# Patient Record
Sex: MALE | Race: WHITE | HISPANIC OR LATINO | Employment: FULL TIME | URBAN - METROPOLITAN AREA
[De-identification: names, ages, dates, MRNs, and addresses within clinical notes are randomized per-mention and may not be internally consistent; named-entity substitution may affect disease eponyms.]

---

## 2019-06-26 ENCOUNTER — OFFICE VISIT (OUTPATIENT)
Dept: URGENT CARE | Facility: CLINIC | Age: 54
End: 2019-06-26
Payer: COMMERCIAL

## 2019-06-26 VITALS
RESPIRATION RATE: 20 BRPM | BODY MASS INDEX: 31.39 KG/M2 | WEIGHT: 200 LBS | HEART RATE: 60 BPM | TEMPERATURE: 98 F | OXYGEN SATURATION: 97 % | HEIGHT: 67 IN | SYSTOLIC BLOOD PRESSURE: 136 MMHG | DIASTOLIC BLOOD PRESSURE: 80 MMHG

## 2019-06-26 DIAGNOSIS — K40.90 LEFT INGUINAL HERNIA: Primary | ICD-10-CM

## 2019-06-26 PROCEDURE — 99203 OFFICE O/P NEW LOW 30 MIN: CPT | Performed by: PHYSICIAN ASSISTANT

## 2019-07-01 ENCOUNTER — OFFICE VISIT (OUTPATIENT)
Dept: FAMILY MEDICINE CLINIC | Facility: CLINIC | Age: 54
End: 2019-07-01
Payer: COMMERCIAL

## 2019-07-01 VITALS
SYSTOLIC BLOOD PRESSURE: 144 MMHG | BODY MASS INDEX: 30.45 KG/M2 | OXYGEN SATURATION: 97 % | TEMPERATURE: 97.9 F | WEIGHT: 194 LBS | HEART RATE: 68 BPM | DIASTOLIC BLOOD PRESSURE: 90 MMHG | RESPIRATION RATE: 18 BRPM | HEIGHT: 67 IN

## 2019-07-01 DIAGNOSIS — K40.90 INGUINAL HERNIA OF LEFT SIDE WITHOUT OBSTRUCTION OR GANGRENE: ICD-10-CM

## 2019-07-01 DIAGNOSIS — Z76.89 ENCOUNTER TO ESTABLISH CARE: Primary | ICD-10-CM

## 2019-07-01 DIAGNOSIS — R03.0 ELEVATED BLOOD-PRESSURE READING WITHOUT DIAGNOSIS OF HYPERTENSION: ICD-10-CM

## 2019-07-01 PROCEDURE — T1015 CLINIC SERVICE: HCPCS | Performed by: FAMILY MEDICINE

## 2019-07-01 RX ORDER — IBUPROFEN 200 MG
TABLET ORAL EVERY 6 HOURS PRN
COMMUNITY
End: 2020-07-03 | Stop reason: HOSPADM

## 2019-07-01 NOTE — PATIENT INSTRUCTIONS
Inguinal Hernia   WHAT YOU NEED TO KNOW:   An inguinal hernia happens when organs or abdominal tissue push through a weak spot in the abdominal wall  The abdominal wall is made of fat and muscle  It holds the intestines in place  The hernia may contain fluid, tissue from the abdomen, or part of an organ (such as an intestine)  DISCHARGE INSTRUCTIONS:   Return to the emergency department if:   · You have severe abdominal pain with nausea and vomiting  · Your abdomen is larger than usual      · Your hernia gets bigger or is purple or blue  · You see blood in your bowel movements  · You feel weak, dizzy, or faint  Contact your healthcare provider if:   · You have a fever  · You have questions or concerns about your condition or care  Medicine: You may  need the following:  · NSAIDs , such as ibuprofen, help decrease swelling, pain, and fever  NSAIDs can cause stomach bleeding or kidney problems in certain people  If you take blood thinner medicine, always ask your healthcare provider if NSAIDs are safe for you  Always read the medicine label and follow directions  · Take your medicine as directed  Contact your healthcare provider if you think your medicine is not helping or if you have side effects  Tell him or her if you are allergic to any medicine  Keep a list of the medicines, vitamins, and herbs you take  Include the amounts, and when and why you take them  Bring the list or the pill bottles to follow-up visits  Carry your medicine list with you in case of an emergency  Follow up with your healthcare provider as directed:  Write down your questions so you remember to ask them during your visits  Manage your symptoms and prevent another hernia:   · Do not lift anything heavy  Heavy lifting can make your hernia worse or cause another hernia  Ask your healthcare provider how much is safe for you to lift  · Drink liquids as directed    Liquids may prevent constipation and straining during a bowel movement  Ask how much liquid to drink each day and which liquids are best for you  · Eat foods high in fiber  Fiber may prevent constipation and straining during a bowel movement  Foods that contain fiber include fruits, vegetables, beans, lentils, and whole grains  · Maintain a healthy weight  If you are overweight, weight loss may prevent your hernia from getting worse  It may also prevent another hernia  Talk to your healthcare provider about exercise and how to lose weight safely if you are overweight  · Do not smoke  Nicotine and other chemicals in cigarettes and cigars can weaken the abdominal wall  This may increase your risk for another hernia  Ask your healthcare provider for information if you currently smoke and need help to quit  E-cigarettes or smokeless tobacco still contain nicotine  Talk to your healthcare provider before you use these products  © 2017 [address] [employer] Information is for End User's use only and may not be sold, redistributed or otherwise used for commercial purposes  All illustrations and images included in CareNotes® are the copyrighted property of A [employer] M [employer] Inc  or Jayro King  The above information is an  only  It is not intended as medical advice for individual conditions or treatments  Talk to your doctor, nurse or pharmacist before following any medical regimen to see if it is safe and effective for you

## 2019-07-01 NOTE — PROGRESS NOTES
Assessment/Plan:     Diagnoses and all orders for this visit:    Encounter to establish care  Comments:  no medical insurance  gave information for patient to contact financial counselor for available options  Inguinal hernia of left side without obstruction or gangrene  Comments:  palpable and reducible on physical exam  Continue tylenol PRN, avoid any heavy lifting and seek ED if sx worsen  Referral to surgery  Orders:  -     Ambulatory referral to General Surgery; Future    Elevated blood-pressure reading without diagnosis of hypertension  Comments:  Healthy diet, exercise, weight loss  RTC 1 month for recheck  Other orders  -     ibuprofen (MOTRIN) 200 mg tablet; Take by mouth every 6 (six) hours as needed for mild pain          Return in about 1 month (around 7/29/2019), or if symptoms worsen or fail to improve, for Next scheduled follow up  Subjective:        Patient ID: Tracy Mancilla is a 47 y o  male  Chief Complaint   Patient presents with    Hernia     patient states that he went to urgent care last week  patient complanis of some pain & burning in the pelvic region       Patient is a 42-year-old male who presents to the office today to establish care  Patient reports no past medical history of any medical problems  Patient denies any surgeries  Patient does not use tobacco, alcohol, or recreational drugs  Patient is  with 3 children  His family history is unknown  He lives in [facility] with his family  He has no medical insurance  He works in [employer], physical labor  He presented to the urgent care on 06/26/2019 with 3 week history of left lower quadrant abdominal pain  He was noted to have a reducible left inguinal hernia  He was advised to take over-the-counter Tylenol as needed for pain  It was recommended that he follow up with the primary care provider or General surgery      Currently, patient reports that he has been experiencing pain in the left lower abdomen, below beltline  He states that he is experiencing a burning sensation/discomfort when he perform certain tasks  He states discomfort is worse with using slicer at work  He has stopped all heavy lifting  He denies nausea, vomiting, diarrhea, constipation, blood in stool, melena, difficulty urinating, blood in urine, dysuria, testicular pain/swelling  Patient reports that he has been using Tylenol as needed for pain with some improvement  His symptoms have not worsened since onset about 3 weeks ago  Patient denies any trauma or known activity before symptoms occurred  He has no additional concerns today  The following portions of the patient's history were reviewed and updated as appropriate: allergies, current medications, past family history, past medical history, past social history, past surgical history and problem list     Patient Active Problem List   Diagnosis    Inguinal hernia of left side without obstruction or gangrene    Elevated blood-pressure reading without diagnosis of hypertension       Current Outpatient Medications   Medication Sig Dispense Refill    ibuprofen (MOTRIN) 200 mg tablet Take by mouth every 6 (six) hours as needed for mild pain       No current facility-administered medications for this visit  History reviewed  No pertinent past medical history  History reviewed  No pertinent surgical history       Social History     Socioeconomic History    Marital status: /Civil Union     Spouse name: Not on file    Number of children: Not on file    Years of education: Not on file    Highest education level: Not on file   Occupational History    Not on file   Social Needs    Financial resource strain: Not on file    Food insecurity:     Worry: Not on file     Inability: Not on file    Transportation needs:     Medical: Not on file     Non-medical: Not on file   Tobacco Use    Smoking status: Never Smoker    Smokeless tobacco: Never Used Substance and Sexual Activity    Alcohol use: Never     Frequency: Never    Drug use: Never    Sexual activity: Not on file   Lifestyle    Physical activity:     Days per week: Not on file     Minutes per session: Not on file    Stress: Not on file   Relationships    Social connections:     Talks on phone: Not on file     Gets together: Not on file     Attends [employer]ism service: Not on file     Active member of club or organization: Not on file     Attends meetings of clubs or organizations: Not on file     Relationship status: Not on file    Intimate partner violence:     Fear of current or ex partner: Not on file     Emotionally abused: Not on file     Physically abused: Not on file     Forced sexual activity: Not on file   Other Topics Concern    Not on file   Social History Narrative    Not on file        Review of Systems   Constitutional: Negative  Eyes: Negative for photophobia and visual disturbance  Respiratory: Negative  Cardiovascular: Negative  Gastrointestinal: Positive for abdominal pain  Negative for abdominal distention, anal bleeding, blood in stool, constipation, diarrhea, nausea, rectal pain and vomiting  Genitourinary: Negative  Neurological: Negative  Hematological: Negative  Objective:      /90   Pulse 68   Temp 97 9 °F (36 6 °C)   Resp 18   Ht 5' 7" (1 702 m)   Wt 88 kg (194 lb)   SpO2 97%   BMI 30 38 kg/m²          Physical Exam   Constitutional: He is oriented to person, place, and time  He appears well-developed and well-nourished  overweight   HENT:   Head: Normocephalic and atraumatic  Right Ear: Tympanic membrane, external ear and ear canal normal    Left Ear: Tympanic membrane, external ear and ear canal normal    Nose: Nose normal    Mouth/Throat: Oropharynx is clear and moist    Eyes: Pupils are equal, round, and reactive to light  Conjunctivae are normal    Neck: Neck supple     Cardiovascular: Normal rate, regular rhythm and normal heart sounds  Exam reveals no gallop and no friction rub  No murmur heard  Pulmonary/Chest: Effort normal and breath sounds normal    Abdominal: Soft  Normal appearance  There is no tenderness  There is no rigidity, no rebound, no guarding, no CVA tenderness, no tenderness at McBurney's point and negative Bailey's sign  A hernia is present  Hernia confirmed positive in the left inguinal area  Hernia confirmed negative in the right inguinal area  Genitourinary:         Musculoskeletal: He exhibits no edema  Lymphadenopathy: No inguinal adenopathy noted on the right or left side  Neurological: He is alert and oriented to person, place, and time

## 2019-07-08 ENCOUNTER — CONSULT (OUTPATIENT)
Dept: SURGERY | Facility: HOSPITAL | Age: 54
End: 2019-07-08

## 2019-07-08 VITALS
SYSTOLIC BLOOD PRESSURE: 142 MMHG | BODY MASS INDEX: 30.45 KG/M2 | HEART RATE: 80 BPM | HEIGHT: 67 IN | DIASTOLIC BLOOD PRESSURE: 80 MMHG | WEIGHT: 194 LBS

## 2019-07-08 DIAGNOSIS — K40.90 INGUINAL HERNIA OF LEFT SIDE WITHOUT OBSTRUCTION OR GANGRENE: Primary | ICD-10-CM

## 2019-07-08 DIAGNOSIS — K42.9 UMBILICAL HERNIA WITHOUT OBSTRUCTION AND WITHOUT GANGRENE: ICD-10-CM

## 2019-07-08 PROCEDURE — 99242 OFF/OP CONSLTJ NEW/EST SF 20: CPT | Performed by: SURGERY

## 2019-07-08 NOTE — LETTER
July 8, 2019     Shae Garsia PA-C  [address] [address]    Patient: Kirsten Saenz   YOB: 1965   Date of Visit: 7/8/2019       Dear Dr Soham Holliday: Thank you for referring Kirsten Saenz to me for evaluation  Below are my notes for this consultation  If you have questions, please do not hesitate to call me  I look forward to following your patient along with you  Sincerely,        Maria D Lopez DO        CC: No Recipients  Maria D Lopez DO  7/8/2019  2:43 PM  Sign at close encounter  Assessment/Plan:    Inguinal hernia of left side without obstruction or gangrene  Symptomatic left inguinal hernia  No significant pain just and oriented as of now  Patient currently without insurance  Signs and symptoms of incarceration strangulation were discussed the patient was advised to seek immediate medical management if this should occur  For now continue to watch  If patient does obtain a type of insurance and would like to have this repaired he is encouraged to come back and see me  Umbilical hernia  Asymptomatic umbilical hernia likely containing fat  No surgical intervention at this time  Continue to observe  Again signs of incarceration strangulation were discussed  Diagnoses and all orders for this visit:    Inguinal hernia of left side without obstruction or gangrene    Umbilical hernia without obstruction and without gangrene          Subjective:      Patient ID: Kirsten Saenz is a 47 y o  male  63-year-old male with no significant past medical history, presents today for evaluation of left groin pain  Recent seen by PCP who is felt that he had likely a symptomatic left inguinal hernia  Patient states he has had this intermittent pain and for the last 5 weeks  It is more burning the CT of radiates down towards the testicle  And does not relate in mid teniae exacerbating or alleviating factors    Patient states he can walk in walk up stairs and and lift heavy objects without any significant pain  However occasionally after some increased activity he does have some burning in the left groin which she describes of morbid 2 or 3/10 pain  Denies any nausea vomiting  No fevers or chills  No changes in bowel or bladder habits  The following portions of the patient's history were reviewed and updated as appropriate:   He  has no past medical history on file  He   Patient Active Problem List    Diagnosis Date Noted    Umbilical hernia 58/14/9271    Inguinal hernia of left side without obstruction or gangrene 07/01/2019    Elevated blood-pressure reading without diagnosis of hypertension 07/01/2019     He  has no past surgical history on file  His Family history is unknown by patient  He  reports that he has never smoked  He has never used smokeless tobacco  He reports that he does not drink alcohol or use drugs  Current Outpatient Medications   Medication Sig Dispense Refill    ibuprofen (MOTRIN) 200 mg tablet Take by mouth every 6 (six) hours as needed for mild pain       No current facility-administered medications for this visit  He has No Known Allergies       Review of Systems   Constitutional: Negative for activity change, appetite change, chills, diaphoresis, fatigue, fever and unexpected weight change  Gastrointestinal: Positive for abdominal pain (Left groin pain)  Negative for abdominal distention, constipation, diarrhea, nausea and rectal pain  Genitourinary: Negative for difficulty urinating  Objective:      /80   Pulse 80   Ht 5' 7" (1 702 m)   Wt 88 kg (194 lb)   BMI 30 38 kg/m²           Physical Exam   Constitutional: He is oriented to person, place, and time  He appears well-developed and well-nourished  No distress  HENT:   Head: Normocephalic and atraumatic  Eyes: No scleral icterus  Abdominal: Soft  He exhibits no distension and no mass  There is tenderness   There is no rebound and no guarding (mild tenderness along the left groin  )  A hernia ( umbilical hernia likely containing fat ) is present  Hernia confirmed positive in the left inguinal area  Hernia confirmed negative in the right inguinal area  Lymphadenopathy: No inguinal adenopathy noted on the right or left side  Neurological: He is alert and oriented to person, place, and time  No cranial nerve deficit  Skin: Skin is warm  No rash noted  He is not diaphoretic  No erythema  No pallor  Psychiatric: He has a normal mood and affect  His behavior is normal    Vitals reviewed

## 2019-07-08 NOTE — ASSESSMENT & PLAN NOTE
Symptomatic left inguinal hernia  No significant pain just and oriented as of now  Patient currently without insurance  Signs and symptoms of incarceration strangulation were discussed the patient was advised to seek immediate medical management if this should occur  For now continue to watch  If patient does obtain a type of insurance and would like to have this repaired he is encouraged to come back and see me

## 2019-07-08 NOTE — PROGRESS NOTES
Assessment/Plan:    Inguinal hernia of left side without obstruction or gangrene  Symptomatic left inguinal hernia  No significant pain just and oriented as of now  Patient currently without insurance  Signs and symptoms of incarceration strangulation were discussed the patient was advised to seek immediate medical management if this should occur  For now continue to watch  If patient does obtain a type of insurance and would like to have this repaired he is encouraged to come back and see me  Umbilical hernia  Asymptomatic umbilical hernia likely containing fat  No surgical intervention at this time  Continue to observe  Again signs of incarceration strangulation were discussed  Diagnoses and all orders for this visit:    Inguinal hernia of left side without obstruction or gangrene    Umbilical hernia without obstruction and without gangrene          Subjective:      Patient ID: Ansley Marie is a 47 y o  male  42-year-old male with no significant past medical history, presents today for evaluation of left groin pain  Recent seen by PCP who is felt that he had likely a symptomatic left inguinal hernia  Patient states he has had this intermittent pain and for the last 5 weeks  It is more burning the CT of radiates down towards the testicle  And does not relate in mid teniae exacerbating or alleviating factors  Patient states he can walk in walk up stairs and and lift heavy objects without any significant pain  However occasionally after some increased activity he does have some burning in the left groin which she describes of morbid 2 or 3/10 pain  Denies any nausea vomiting  No fevers or chills  No changes in bowel or bladder habits  The following portions of the patient's history were reviewed and updated as appropriate:   He  has no past medical history on file    He   Patient Active Problem List    Diagnosis Date Noted    Umbilical hernia 73/87/8411    Inguinal hernia of left side without obstruction or gangrene 07/01/2019    Elevated blood-pressure reading without diagnosis of hypertension 07/01/2019     He  has no past surgical history on file  His Family history is unknown by patient  He  reports that he has never smoked  He has never used smokeless tobacco  He reports that he does not drink alcohol or use drugs  Current Outpatient Medications   Medication Sig Dispense Refill    ibuprofen (MOTRIN) 200 mg tablet Take by mouth every 6 (six) hours as needed for mild pain       No current facility-administered medications for this visit  He has No Known Allergies       Review of Systems   Constitutional: Negative for activity change, appetite change, chills, diaphoresis, fatigue, fever and unexpected weight change  Gastrointestinal: Positive for abdominal pain (Left groin pain)  Negative for abdominal distention, constipation, diarrhea, nausea and rectal pain  Genitourinary: Negative for difficulty urinating  Objective:      /80   Pulse 80   Ht 5' 7" (1 702 m)   Wt 88 kg (194 lb)   BMI 30 38 kg/m²          Physical Exam   Constitutional: He is oriented to person, place, and time  He appears well-developed and well-nourished  No distress  HENT:   Head: Normocephalic and atraumatic  Eyes: No scleral icterus  Abdominal: Soft  He exhibits no distension and no mass  There is tenderness  There is no rebound and no guarding (mild tenderness along the left groin  )  A hernia ( umbilical hernia likely containing fat ) is present  Hernia confirmed positive in the left inguinal area  Hernia confirmed negative in the right inguinal area  Lymphadenopathy: No inguinal adenopathy noted on the right or left side  Neurological: He is alert and oriented to person, place, and time  No cranial nerve deficit  Skin: Skin is warm  No rash noted  He is not diaphoretic  No erythema  No pallor  Psychiatric: He has a normal mood and affect   His behavior is normal  Vitals reviewed

## 2019-07-08 NOTE — ASSESSMENT & PLAN NOTE
Asymptomatic umbilical hernia likely containing fat  No surgical intervention at this time  Continue to observe  Again signs of incarceration strangulation were discussed

## 2020-02-24 DIAGNOSIS — Z12.11 COLON CANCER SCREENING: Primary | ICD-10-CM

## 2020-07-01 ENCOUNTER — TELEPHONE (OUTPATIENT)
Dept: OTHER | Facility: OTHER | Age: 55
End: 2020-07-01

## 2020-07-01 NOTE — TELEPHONE ENCOUNTER
[phone] Landon Jim/[birth year]/Hernia is worsening, needs to stop about every hour to ice it  Extreme pain and discomfort

## 2020-07-02 ENCOUNTER — APPOINTMENT (OUTPATIENT)
Dept: CT IMAGING | Facility: HOSPITAL | Age: 55
End: 2020-07-02
Payer: OTHER GOVERNMENT

## 2020-07-02 ENCOUNTER — HOSPITAL ENCOUNTER (EMERGENCY)
Facility: HOSPITAL | Age: 55
End: 2020-07-02
Attending: EMERGENCY MEDICINE | Admitting: EMERGENCY MEDICINE
Payer: OTHER GOVERNMENT

## 2020-07-02 ENCOUNTER — ANESTHESIA (INPATIENT)
Dept: PERIOP | Facility: HOSPITAL | Age: 55
End: 2020-07-02

## 2020-07-02 ENCOUNTER — HOSPITAL ENCOUNTER (OUTPATIENT)
Facility: HOSPITAL | Age: 55
Setting detail: OUTPATIENT SURGERY
Discharge: HOME | End: 2020-07-03
Attending: SURGERY | Admitting: SURGERY
Payer: OTHER GOVERNMENT

## 2020-07-02 ENCOUNTER — ANESTHESIA EVENT (INPATIENT)
Dept: PERIOP | Facility: HOSPITAL | Age: 55
End: 2020-07-02

## 2020-07-02 VITALS
TEMPERATURE: 98.3 F | OXYGEN SATURATION: 100 % | WEIGHT: 187.17 LBS | BODY MASS INDEX: 29.38 KG/M2 | SYSTOLIC BLOOD PRESSURE: 165 MMHG | HEIGHT: 67 IN | HEART RATE: 65 BPM | RESPIRATION RATE: 18 BRPM | DIASTOLIC BLOOD PRESSURE: 85 MMHG

## 2020-07-02 DIAGNOSIS — K40.90 LEFT INGUINAL HERNIA: Primary | ICD-10-CM

## 2020-07-02 DIAGNOSIS — K40.90 INGUINAL HERNIA OF LEFT SIDE WITHOUT OBSTRUCTION OR GANGRENE: Primary | ICD-10-CM

## 2020-07-02 LAB
ALBUMIN SERPL BCP-MCNC: 4 G/DL (ref 3.5–5)
ALP SERPL-CCNC: 40 U/L (ref 46–116)
ALT SERPL W P-5'-P-CCNC: 30 U/L (ref 12–78)
ANION GAP SERPL CALCULATED.3IONS-SCNC: 8 MMOL/L (ref 4–13)
AST SERPL W P-5'-P-CCNC: 20 U/L (ref 5–45)
BASOPHILS # BLD AUTO: 0.04 THOUSANDS/ΜL (ref 0–0.1)
BASOPHILS NFR BLD AUTO: 1 % (ref 0–1)
BILIRUB SERPL-MCNC: 0.9 MG/DL (ref 0.2–1)
BUN SERPL-MCNC: 16 MG/DL (ref 5–25)
CALCIUM SERPL-MCNC: 8.9 MG/DL (ref 8.3–10.1)
CHLORIDE SERPL-SCNC: 105 MMOL/L (ref 100–108)
CO2 SERPL-SCNC: 29 MMOL/L (ref 21–32)
CREAT SERPL-MCNC: 0.89 MG/DL (ref 0.6–1.3)
EOSINOPHIL # BLD AUTO: 0.04 THOUSAND/ΜL (ref 0–0.61)
EOSINOPHIL NFR BLD AUTO: 1 % (ref 0–6)
ERYTHROCYTE [DISTWIDTH] IN BLOOD BY AUTOMATED COUNT: 12.9 % (ref 11.6–15.1)
GFR SERPL CREATININE-BSD FRML MDRD: 96 ML/MIN/1.73SQ M
GLUCOSE SERPL-MCNC: 97 MG/DL (ref 65–140)
HCT VFR BLD AUTO: 40.8 % (ref 36.5–49.3)
HGB BLD-MCNC: 13.4 G/DL (ref 12–17)
IMM GRANULOCYTES # BLD AUTO: 0.02 THOUSAND/UL (ref 0–0.2)
IMM GRANULOCYTES NFR BLD AUTO: 0 % (ref 0–2)
INR PPP: 1.05 (ref 0.84–1.19)
LYMPHOCYTES # BLD AUTO: 1.77 THOUSANDS/ΜL (ref 0.6–4.47)
LYMPHOCYTES NFR BLD AUTO: 27 % (ref 14–44)
MCH RBC QN AUTO: 29.5 PG (ref 26.8–34.3)
MCHC RBC AUTO-ENTMCNC: 32.8 G/DL (ref 31.4–37.4)
MCV RBC AUTO: 90 FL (ref 82–98)
MONOCYTES # BLD AUTO: 0.46 THOUSAND/ΜL (ref 0.17–1.22)
MONOCYTES NFR BLD AUTO: 7 % (ref 4–12)
NEUTROPHILS # BLD AUTO: 4.17 THOUSANDS/ΜL (ref 1.85–7.62)
NEUTS SEG NFR BLD AUTO: 64 % (ref 43–75)
NRBC BLD AUTO-RTO: 0 /100 WBCS
PLATELET # BLD AUTO: 292 THOUSANDS/UL (ref 149–390)
PMV BLD AUTO: 9.7 FL (ref 8.9–12.7)
POTASSIUM SERPL-SCNC: 3.7 MMOL/L (ref 3.5–5.3)
PROT SERPL-MCNC: 7.8 G/DL (ref 6.4–8.2)
PROTHROMBIN TIME: 13.7 SECONDS (ref 11.6–14.5)
RBC # BLD AUTO: 4.55 MILLION/UL (ref 3.88–5.62)
SARS-COV-2 RNA RESP QL NAA+PROBE: NEGATIVE
SODIUM SERPL-SCNC: 142 MMOL/L (ref 136–145)
WBC # BLD AUTO: 6.5 THOUSAND/UL (ref 4.31–10.16)

## 2020-07-02 PROCEDURE — 87635 SARS-COV-2 COVID-19 AMP PRB: CPT | Performed by: PHYSICIAN ASSISTANT

## 2020-07-02 PROCEDURE — 80053 COMPREHEN METABOLIC PANEL: CPT | Performed by: PHYSICIAN ASSISTANT

## 2020-07-02 PROCEDURE — NC001 PR NO CHARGE: Performed by: SURGERY

## 2020-07-02 PROCEDURE — C1781 MESH (IMPLANTABLE): HCPCS | Performed by: SURGERY

## 2020-07-02 PROCEDURE — 74177 CT ABD & PELVIS W/CONTRAST: CPT

## 2020-07-02 PROCEDURE — 49505 PRP I/HERN INIT REDUC >5 YR: CPT | Performed by: SURGERY

## 2020-07-02 PROCEDURE — 99285 EMERGENCY DEPT VISIT HI MDM: CPT | Performed by: PHYSICIAN ASSISTANT

## 2020-07-02 PROCEDURE — 49505 PRP I/HERN INIT REDUC >5 YR: CPT | Performed by: PHYSICIAN ASSISTANT

## 2020-07-02 PROCEDURE — 99285 EMERGENCY DEPT VISIT HI MDM: CPT

## 2020-07-02 PROCEDURE — 99214 OFFICE O/P EST MOD 30 MIN: CPT | Performed by: PHYSICIAN ASSISTANT

## 2020-07-02 PROCEDURE — 36415 COLL VENOUS BLD VENIPUNCTURE: CPT | Performed by: PHYSICIAN ASSISTANT

## 2020-07-02 PROCEDURE — 85025 COMPLETE CBC W/AUTO DIFF WBC: CPT | Performed by: PHYSICIAN ASSISTANT

## 2020-07-02 PROCEDURE — 85610 PROTHROMBIN TIME: CPT | Performed by: PHYSICIAN ASSISTANT

## 2020-07-02 DEVICE — BARD MESH PERFIX PLUG, LARGE
Type: IMPLANTABLE DEVICE | Site: INGUINAL | Status: FUNCTIONAL
Brand: BARD MESH PERFIX PLUG

## 2020-07-02 RX ORDER — BUPIVACAINE HYDROCHLORIDE 5 MG/ML
INJECTION, SOLUTION PERINEURAL AS NEEDED
Status: DISCONTINUED | OUTPATIENT
Start: 2020-07-02 | End: 2020-07-02 | Stop reason: HOSPADM

## 2020-07-02 RX ORDER — HYDROMORPHONE HCL/PF 1 MG/ML
0.5 SYRINGE (ML) INJECTION EVERY 4 HOURS PRN
Status: DISCONTINUED | OUTPATIENT
Start: 2020-07-02 | End: 2020-07-02 | Stop reason: HOSPADM

## 2020-07-02 RX ORDER — CEFAZOLIN SODIUM 2 G/50ML
2000 SOLUTION INTRAVENOUS
Status: COMPLETED | OUTPATIENT
Start: 2020-07-02 | End: 2020-07-02

## 2020-07-02 RX ORDER — KETOROLAC TROMETHAMINE 30 MG/ML
INJECTION, SOLUTION INTRAMUSCULAR; INTRAVENOUS AS NEEDED
Status: DISCONTINUED | OUTPATIENT
Start: 2020-07-02 | End: 2020-07-02 | Stop reason: SURG

## 2020-07-02 RX ORDER — FENTANYL CITRATE 50 UG/ML
INJECTION, SOLUTION INTRAMUSCULAR; INTRAVENOUS AS NEEDED
Status: DISCONTINUED | OUTPATIENT
Start: 2020-07-02 | End: 2020-07-02 | Stop reason: SURG

## 2020-07-02 RX ORDER — ONDANSETRON 2 MG/ML
INJECTION INTRAMUSCULAR; INTRAVENOUS AS NEEDED
Status: DISCONTINUED | OUTPATIENT
Start: 2020-07-02 | End: 2020-07-02 | Stop reason: SURG

## 2020-07-02 RX ORDER — PROPOFOL 10 MG/ML
INJECTION, EMULSION INTRAVENOUS AS NEEDED
Status: DISCONTINUED | OUTPATIENT
Start: 2020-07-02 | End: 2020-07-02 | Stop reason: SURG

## 2020-07-02 RX ORDER — DEXAMETHASONE SODIUM PHOSPHATE 10 MG/ML
INJECTION, SOLUTION INTRAMUSCULAR; INTRAVENOUS AS NEEDED
Status: DISCONTINUED | OUTPATIENT
Start: 2020-07-02 | End: 2020-07-02 | Stop reason: SURG

## 2020-07-02 RX ORDER — MORPHINE SULFATE 4 MG/ML
4 INJECTION, SOLUTION INTRAMUSCULAR; INTRAVENOUS
Status: DISCONTINUED | OUTPATIENT
Start: 2020-07-02 | End: 2020-07-03 | Stop reason: HOSPADM

## 2020-07-02 RX ORDER — CEFAZOLIN SODIUM 2 G/50ML
2000 SOLUTION INTRAVENOUS EVERY 8 HOURS
Status: DISCONTINUED | OUTPATIENT
Start: 2020-07-02 | End: 2020-07-03 | Stop reason: HOSPADM

## 2020-07-02 RX ORDER — MAGNESIUM HYDROXIDE 1200 MG/15ML
LIQUID ORAL AS NEEDED
Status: DISCONTINUED | OUTPATIENT
Start: 2020-07-02 | End: 2020-07-02 | Stop reason: HOSPADM

## 2020-07-02 RX ORDER — SUCCINYLCHOLINE/SOD CL,ISO/PF 100 MG/5ML
SYRINGE (ML) INTRAVENOUS AS NEEDED
Status: DISCONTINUED | OUTPATIENT
Start: 2020-07-02 | End: 2020-07-02 | Stop reason: SURG

## 2020-07-02 RX ORDER — ONDANSETRON 2 MG/ML
4 INJECTION INTRAMUSCULAR; INTRAVENOUS EVERY 6 HOURS PRN
Status: DISCONTINUED | OUTPATIENT
Start: 2020-07-02 | End: 2020-07-02 | Stop reason: HOSPADM

## 2020-07-02 RX ORDER — HYDROMORPHONE HCL/PF 1 MG/ML
0.5 SYRINGE (ML) INJECTION
Status: DISCONTINUED | OUTPATIENT
Start: 2020-07-02 | End: 2020-07-02

## 2020-07-02 RX ORDER — ONDANSETRON 2 MG/ML
4 INJECTION INTRAMUSCULAR; INTRAVENOUS EVERY 6 HOURS PRN
Status: DISCONTINUED | OUTPATIENT
Start: 2020-07-02 | End: 2020-07-03 | Stop reason: HOSPADM

## 2020-07-02 RX ORDER — MIDAZOLAM HYDROCHLORIDE 2 MG/2ML
INJECTION, SOLUTION INTRAMUSCULAR; INTRAVENOUS AS NEEDED
Status: DISCONTINUED | OUTPATIENT
Start: 2020-07-02 | End: 2020-07-02 | Stop reason: SURG

## 2020-07-02 RX ORDER — SODIUM CHLORIDE, SODIUM LACTATE, POTASSIUM CHLORIDE, CALCIUM CHLORIDE 600; 310; 30; 20 MG/100ML; MG/100ML; MG/100ML; MG/100ML
100 INJECTION, SOLUTION INTRAVENOUS CONTINUOUS
Status: DISCONTINUED | OUTPATIENT
Start: 2020-07-02 | End: 2020-07-02 | Stop reason: HOSPADM

## 2020-07-02 RX ORDER — SODIUM CHLORIDE 9 MG/ML
125 INJECTION, SOLUTION INTRAVENOUS CONTINUOUS
Status: DISCONTINUED | OUTPATIENT
Start: 2020-07-02 | End: 2020-07-03 | Stop reason: HOSPADM

## 2020-07-02 RX ORDER — ACETAMINOPHEN 325 MG/1
650 TABLET ORAL EVERY 6 HOURS PRN
Status: DISCONTINUED | OUTPATIENT
Start: 2020-07-02 | End: 2020-07-03 | Stop reason: HOSPADM

## 2020-07-02 RX ORDER — SODIUM CHLORIDE 9 MG/ML
125 INJECTION, SOLUTION INTRAVENOUS CONTINUOUS
Status: DISCONTINUED | OUTPATIENT
Start: 2020-07-02 | End: 2020-07-02

## 2020-07-02 RX ORDER — CEFAZOLIN SODIUM 2 G/50ML
2000 SOLUTION INTRAVENOUS
Status: DISCONTINUED | OUTPATIENT
Start: 2020-07-03 | End: 2020-07-02 | Stop reason: SDUPTHER

## 2020-07-02 RX ORDER — OXYCODONE HYDROCHLORIDE 10 MG/1
10 TABLET ORAL EVERY 6 HOURS PRN
Status: DISCONTINUED | OUTPATIENT
Start: 2020-07-02 | End: 2020-07-03 | Stop reason: HOSPADM

## 2020-07-02 RX ORDER — PROMETHAZINE HYDROCHLORIDE 25 MG/ML
25 INJECTION, SOLUTION INTRAMUSCULAR; INTRAVENOUS ONCE AS NEEDED
Status: DISCONTINUED | OUTPATIENT
Start: 2020-07-02 | End: 2020-07-02

## 2020-07-02 RX ORDER — OXYCODONE HYDROCHLORIDE AND ACETAMINOPHEN 5; 325 MG/1; MG/1
1 TABLET ORAL EVERY 4 HOURS PRN
Qty: 20 TABLET | Refills: 0 | Status: SHIPPED | OUTPATIENT
Start: 2020-07-02

## 2020-07-02 RX ORDER — HEPARIN SODIUM 5000 [USP'U]/ML
5000 INJECTION, SOLUTION INTRAVENOUS; SUBCUTANEOUS EVERY 8 HOURS SCHEDULED
Status: DISCONTINUED | OUTPATIENT
Start: 2020-07-03 | End: 2020-07-03 | Stop reason: HOSPADM

## 2020-07-02 RX ORDER — FENTANYL CITRATE/PF 50 MCG/ML
50 SYRINGE (ML) INJECTION
Status: DISCONTINUED | OUTPATIENT
Start: 2020-07-02 | End: 2020-07-02

## 2020-07-02 RX ORDER — EPHEDRINE SULFATE 50 MG/ML
INJECTION INTRAVENOUS AS NEEDED
Status: DISCONTINUED | OUTPATIENT
Start: 2020-07-02 | End: 2020-07-02 | Stop reason: SURG

## 2020-07-02 RX ADMIN — DEXAMETHASONE SODIUM PHOSPHATE 10 MG: 10 INJECTION, SOLUTION INTRAMUSCULAR; INTRAVENOUS at 17:01

## 2020-07-02 RX ADMIN — SODIUM CHLORIDE, SODIUM LACTATE, POTASSIUM CHLORIDE, AND CALCIUM CHLORIDE 100 ML/HR: .6; .31; .03; .02 INJECTION, SOLUTION INTRAVENOUS at 11:35

## 2020-07-02 RX ADMIN — FENTANYL CITRATE 50 MCG: 50 INJECTION INTRAMUSCULAR; INTRAVENOUS at 16:44

## 2020-07-02 RX ADMIN — SODIUM CHLORIDE 125 ML/HR: 0.9 INJECTION, SOLUTION INTRAVENOUS at 14:55

## 2020-07-02 RX ADMIN — MIDAZOLAM HYDROCHLORIDE 2 MG: 1 INJECTION, SOLUTION INTRAMUSCULAR; INTRAVENOUS at 16:48

## 2020-07-02 RX ADMIN — FENTANYL CITRATE 50 MCG: 50 INJECTION INTRAMUSCULAR; INTRAVENOUS at 16:52

## 2020-07-02 RX ADMIN — OXYCODONE HYDROCHLORIDE 10 MG: 10 TABLET ORAL at 22:03

## 2020-07-02 RX ADMIN — ONDANSETRON 4 MG: 2 INJECTION INTRAMUSCULAR; INTRAVENOUS at 17:01

## 2020-07-02 RX ADMIN — CEFAZOLIN SODIUM 2000 MG: 2 SOLUTION INTRAVENOUS at 11:36

## 2020-07-02 RX ADMIN — EPHEDRINE SULFATE 10 MG: 50 INJECTION, SOLUTION INTRAVENOUS at 17:19

## 2020-07-02 RX ADMIN — PROPOFOL 200 MG: 10 INJECTION, EMULSION INTRAVENOUS at 16:49

## 2020-07-02 RX ADMIN — CEFAZOLIN SODIUM 2000 MG: 2 SOLUTION INTRAVENOUS at 16:44

## 2020-07-02 RX ADMIN — Medication 100 MG: at 16:49

## 2020-07-02 RX ADMIN — SODIUM CHLORIDE 125 ML/HR: 0.9 INJECTION, SOLUTION INTRAVENOUS at 20:27

## 2020-07-02 RX ADMIN — KETOROLAC TROMETHAMINE 30 MG: 30 INJECTION, SOLUTION INTRAMUSCULAR; INTRAVENOUS at 18:28

## 2020-07-02 RX ADMIN — MORPHINE SULFATE 4 MG: 4 INJECTION INTRAVENOUS at 20:28

## 2020-07-02 RX ADMIN — IOHEXOL 100 ML: 350 INJECTION, SOLUTION INTRAVENOUS at 11:30

## 2020-07-02 NOTE — EMTALA/ACUTE CARE TRANSFER
[facility] EMERGENCY DEPARTMENT  [address] [place] [id number]  Dept: [phone]      EMTALA TRANSFER CONSENT    NAME Charis Whitten                                         1965                              MRN [id number]    I have been informed of my rights regarding examination, treatment, and transfer   by Dr Red Kramer,     Benefits:   general surgery    Risks:   transport risks      Consent for Transfer:  I acknowledge that my medical condition has been evaluated and explained to me by the emergency department physician or other qualified medical person and/or my attending physician, who has recommended that I be transferred to the service of   Dr Poornima Cabrera at  [address] [place] [address]  The above potential benefits of such transfer, the potential risks associated with such transfer, and the probable risks of not being transferred have been explained to me, and I fully understand them  The doctor has explained that, in my case, the benefits of transfer outweigh the risks  I agree to be transferred  I authorize the performance of emergency medical procedures and treatments upon me in both transit and upon arrival at the receiving facility  Additionally, I authorize the release of any and all medical records to the receiving facility and request they be transported with me, if possible  I understand that the safest mode of transportation during a medical emergency is an ambulance and that the Hospital advocates the use of this mode of transport  Risks of traveling to the receiving facility by car, including absence of medical control, life sustaining equipment, such as oxygen, and medical personnel has been explained to me and I fully understand them  (MARIXA CORRECT BOX BELOW)  [  ]  I consent to the stated transfer and to be transported by ambulance/helicopter    [  ]  I consent to the stated transfer, but refuse transportation by ambulance and accept full responsibility for my transportation by car  I understand the risks of non-ambulance transfers and I exonerate the Hospital and its staff from any deterioration in my condition that results from this refusal     X___________________________________________    DATE  20  TIME________  Signature of patient or legally responsible individual signing on patient behalf           RELATIONSHIP TO PATIENT_________________________          Provider Certification    NAME Binnie Holter                                         1965                              MRN [id number]    A medical screening exam was performed on the above named patient  Based on the examination:    Condition Necessitating Transfer The encounter diagnosis was Left inguinal hernia  Patient Condition:  Stable    Reason for Transfer:  General Surgery    Transfer Requirements: [facility]  · Space available and qualified personnel available for treatment as acknowledged by    · Agreed to accept transfer and to provide appropriate medical treatment as acknowledged by          · Appropriate medical records of the examination and treatment of the patient are provided at the time of transfer   [facility], [address] _______  · Transfer will be performed by qualified personnel from    and appropriate transfer equipment as required, including the use of necessary and appropriate life support measures      Provider Certification: I have examined the patient and explained the following risks and benefits of being transferred/refusing transfer to the patient/family:         Based on these reasonable risks and benefits to the patient and/or the unborn child(thor), and based upon the information available at the time of the patients examination, I certify that the medical benefits reasonably to be expected from the provision of appropriate medical treatments at another medical facility outweigh the increasing risks, if any, to the individuals medical condition, and in the case of labor to the unborn child, from effecting the transfer      X____________________________________________ DATE 07/02/20        TIME_______      ORIGINAL - SEND TO MEDICAL RECORDS   COPY - SEND WITH PATIENT DURING TRANSFER

## 2020-07-02 NOTE — H&P
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 do any type of daily activities without severe pain  Patient initially seen at [address] [place] [address]  Due to the fact that he was given oral contrast in the ER for CT scan he was unable to undergo an operation in a timely fashion and subsequently due to on-call regulations between the 2 os pills, he was transferred to [facility] to undergo a left inguinal hernia repair  Plan:  - IV fluid  - pain control  - NPO    Preop consent for left inguinal hernia pair with mesh  History of Present Illness     HPI:  Julio Juarez is a 54 y o  male with known left inguinal hernia, presents to the ER at [address] [place] [address] with worsening pain over the last few days in the left groin  Patient was initially seen by me approximately 1 year ago and was symptomatic but states that he was able to do his daily activities and work without any significant problems and he would prefer to hold off surgery  However over the last few days/week the pain is worse in the left lower quadrant  He is sometimes able to reduce the hernia however he states that it hurts when he is lying when he sitting and when he standing  He is unable to do his daily life activities and is tough for him to work  He call the online service last evening and I spoke with him and that time he states that he was icing his groin with little improvement  He want to come to the ER and is spoke Maria Isabel Da Silva stated that the best thing to do if there was that that was to come to the ER and get checked down potentially undergo surgery  He presented today admit [address] [place] [address] again with worsening pain left lower quadrant  Pain was sharp stabbing and localized the left groin    No overlying skin changes  No nausea vomiting  No fevers or chills  No changes in bowel or bladder habits  No shortness of breath or chest pain  Consults    Review of Systems     A review systems was completed, all negative except as noted above HPI  Historical Information   Past Medical History:   Diagnosis Date    Chronic dental pain     Left inguinal hernia     Umbilical hernia      History reviewed  No pertinent surgical history    Social History   Social History     Substance and Sexual Activity   Alcohol Use Never    Frequency: Never     Social History     Substance and Sexual Activity   Drug Use Never     Social History     Tobacco Use   Smoking Status Never Smoker   Smokeless Tobacco Never Used     Family History: non-contributory    Meds/Allergies   all current active meds have been reviewed  No Known Allergies    Objective   First Vitals:   Blood Pressure: 142/81 (07/02/20 1437)  Pulse: 63 (07/02/20 1437)  Temperature: 98 4 °F (36 9 °C) (07/02/20 1437)  Temp Source: Tympanic (07/02/20 1437)  Respirations: 18 (07/02/20 1437)  Height: 5' 7" (170 2 cm) (07/02/20 1434)  Weight - Scale: 83 3 kg (183 lb 12 1 oz) (07/02/20 1434)  SpO2: 99 % (07/02/20 1437)    Current Vitals:   Blood Pressure: 142/81 (07/02/20 1437)  Pulse: 63 (07/02/20 1437)  Temperature: 98 4 °F (36 9 °C) (07/02/20 1437)  Temp Source: Tympanic (07/02/20 1437)  Respirations: 18 (07/02/20 1437)  Height: 5' 7" (170 2 cm) (07/02/20 1434)  Weight - Scale: 83 3 kg (183 lb 12 1 oz) (07/02/20 1434)  SpO2: 99 % (07/02/20 1437)      Intake/Output Summary (Last 24 hours) at 7/2/2020 1837  Last data filed at 7/2/2020 1749  Gross per 24 hour   Intake    Output 50 ml   Net -50 ml       Invasive Devices     Peripheral Intravenous Line            Peripheral IV 07/02/20 Left Antecubital less than 1 day          Airway            ETT  Cuffed;Oral 7 5 mm less than 1 day                Physical Exam   General:  No acute distress, resting comfortably  HEENT:  No 2nd atraumatic trachea midline  CV:  S1 audible, regular rate rhythm, no murmurs rubs or gallops  Pulmonary:  Clear auscultation bilaterally, no wheezes rales rhonchi  GI:  Abdomen soft, nontender nondistended  Left inguinal region with noted palpable hernia with significant tender palpation, partially reducible  No overlying skin changes  MSK:  Without clubbing cyanosis  Neurologic:  Alert and x3, cranial 2-12 grossly intact  Psych:  Mood and affect appropriate    Lab Results:   I have personally reviewed pertinent lab results  , CBC:   Lab Results   Component Value Date    WBC 6 50 07/02/2020    HGB 13 4 07/02/2020    HCT 40 8 07/02/2020    MCV 90 07/02/2020     07/02/2020    MCH 29 5 07/02/2020    MCHC 32 8 07/02/2020    RDW 12 9 07/02/2020    MPV 9 7 07/02/2020    NRBC 0 07/02/2020   , CMP:   Lab Results   Component Value Date    SODIUM 142 07/02/2020    K 3 7 07/02/2020     07/02/2020    CO2 29 07/02/2020    BUN 16 07/02/2020    CREATININE 0 89 07/02/2020    CALCIUM 8 9 07/02/2020    AST 20 07/02/2020    ALT 30 07/02/2020    ALKPHOS 40 (L) 07/02/2020    EGFR 96 07/02/2020   , Coagulation:   Lab Results   Component Value Date    INR 1 05 07/02/2020   , Urinalysis: No results found for: [place], SPECGRAV, PHUR, LEUKOCYTESUR, NITRITE, PROTEINUA, GLUCOSEU, KETONESU, BILIRUBINUR, BLOODU, Amylase: No results found for: AMYLASE, Lipase: No results found for: LIPASE  Imaging: I have personally reviewed pertinent films in PACS  EKG, Pathology, and Other Studies: I have personally reviewed pertinent reports

## 2020-07-02 NOTE — H&P
H&P- Dominguez Amanda 1965, 54 y o  male MRN: [id number]    Unit/Bed#: -02 Encounter: [id number]    Primary Care Provider: Lukasz Chapman PA-C   Date and time admitted to hospital: 7/2/2020  2:33 PM        * Inguinal hernia of left side without obstruction or gangrene  Assessment & Plan  Pleasant 53 yo M without significant PMH presenting for acute on chronic pain secondary to fat containing LIH  Patient seen and evaluated in [address] [place] [address] ER by Dr Adilene García, transferred due to after hour OR protocols to [address] [place] [address] [address]  Clinically stable, pain persistent, but controlled, no nausea, vomiting  Exam reveals asymptomatic umbilical hernia and tender fat containing left inguinal hernia  Afebrile, vital signs stable  CBC, CMP, INR all WNL, Covid negative  Plan:  Admit under Dr Adilene García  NPO, IVF, pain control  To go to OR today for left inguinal herniorrhaphy  Procedure, risks, benefits, and alternatives dicussed and questions answered  Dr Adilene García to obtain informed consent  History of Present Illness   HPI:  Dominguez Amanda is a 54 y o  male without PMH presenting with acute on chronic pain related to [facility]  Hernia present for "years"  Seen by Dr Adilene García in 07/2019, but did not pursue surgical intervention  Since that time the hernia has grown in size and causing more significant pain  Admits to left groin pain as well as lumbar pack pain he attributes to the hernia  Works in  requiring heavy lifting  Uses Truss belt/underwear for support  Reports hernia remains reducible, but will bulge out almost immediately afterwards  Has known umbilical hernia that is asymptomatic  No nausea, vomiting, abdominal pain, diarrhea, constipation  No fever, chills, chest pain, dyspnea  Patient primarily [address] speaking, but able to hold conversation in [place]   Demonstrates understanding of topics discussed as well as risks of surgery and expected post-op course  Review of Systems   Constitutional: Negative for chills and fever  Respiratory: Negative for cough and shortness of breath  Cardiovascular: Negative for chest pain and leg swelling  Gastrointestinal: Negative for abdominal pain, constipation, diarrhea, nausea and vomiting  Genitourinary:        Groin pain, left side  Musculoskeletal: Positive for back pain  All other systems reviewed and are negative  Historical Information   Past Medical History:   Diagnosis Date    Chronic dental pain     Left inguinal hernia     Umbilical hernia      History reviewed  No pertinent surgical history  Social History   Social History     Substance and Sexual Activity   Alcohol Use Never    Frequency: Never     Social History     Substance and Sexual Activity   Drug Use Never     Social History     Tobacco Use   Smoking Status Never Smoker   Smokeless Tobacco Never Used     E-Cigarette/Vaping    E-Cigarette Use Never User      E-Cigarette/Vaping Substances     Family History:   Family History   Problem Relation Age of Onset    Cancer Neg Hx        Meds/Allergies   PTA meds:   Prior to Admission Medications   Prescriptions Last Dose Informant Patient Reported?  Taking?   ibuprofen (MOTRIN) 200 mg tablet   Yes No   Sig: Take by mouth every 6 (six) hours as needed for mild pain      Facility-Administered Medications: None     No Known Allergies    Objective   First Vitals:   Blood Pressure: 142/81 (07/02/20 1437)  Pulse: 63 (07/02/20 1437)  Temperature: 98 4 °F (36 9 °C) (07/02/20 1437)  Temp Source: Tympanic (07/02/20 1437)  Respirations: 18 (07/02/20 1437)  Height: 5' 7" (170 2 cm) (07/02/20 1434)  Weight - Scale: 83 3 kg (183 lb 12 1 oz) (07/02/20 1434)  SpO2: 99 % (07/02/20 1437)    Current Vitals:   Blood Pressure: 142/81 (07/02/20 1437)  Pulse: 63 (07/02/20 1437)  Temperature: 98 4 °F (36 9 °C) (07/02/20 1437)  Temp Source: Tympanic (07/02/20 1437)  Respirations: 18 (07/02/20 1437)  Height: 5' 7" (170 2 cm) (07/02/20 1434)  Weight - Scale: 83 3 kg (183 lb 12 1 oz) (07/02/20 1434)  SpO2: 99 % (07/02/20 1437)    No intake or output data in the 24 hours ending 07/02/20 1524    Invasive Devices     Peripheral Intravenous Line            Peripheral IV 07/02/20 Left Antecubital less than 1 day                Physical Exam   Constitutional: He is oriented to person, place, and time  He appears well-developed and well-nourished  No distress  HENT:   Head: Normocephalic and atraumatic  Eyes: Pupils are equal, round, and reactive to light  EOM are normal    Neck: Normal range of motion  Cardiovascular: Normal rate and regular rhythm  No murmur heard  Pulmonary/Chest: Effort normal and breath sounds normal  No respiratory distress  Abdominal: Soft  Bowel sounds are normal  He exhibits no distension  There is no tenderness  Genitourinary:         Musculoskeletal: Normal range of motion  Neurological: He is alert and oriented to person, place, and time  Skin: Skin is warm and dry  Capillary refill takes less than 2 seconds  No rash noted  He is not diaphoretic  Psychiatric: He has a normal mood and affect  His behavior is normal    Nursing note and vitals reviewed  Lab Results:   I have personally reviewed pertinent lab results  , CBC:   Lab Results   Component Value Date    WBC 6 50 07/02/2020    HGB 13 4 07/02/2020    HCT 40 8 07/02/2020    MCV 90 07/02/2020     07/02/2020    MCH 29 5 07/02/2020    MCHC 32 8 07/02/2020    RDW 12 9 07/02/2020    MPV 9 7 07/02/2020    NRBC 0 07/02/2020   , CMP:   Lab Results   Component Value Date    SODIUM 142 07/02/2020    K 3 7 07/02/2020     07/02/2020    CO2 29 07/02/2020    BUN 16 07/02/2020    CREATININE 0 89 07/02/2020    CALCIUM 8 9 07/02/2020    AST 20 07/02/2020    ALT 30 07/02/2020    ALKPHOS 40 (L) 07/02/2020    EGFR 96 07/02/2020     Imaging: I have personally reviewed pertinent reports     and I have personally reviewed pertinent films in PACS  EKG, Pathology, and Other Studies: I have personally reviewed pertinent reports  Code Status: Prior  Advance Directive and Living Will:      Power of :    POLST:      Counseling / Coordination of Care  Total floor / unit time spent today 25 minutes  Greater than 50% of total time was spent with the patient and / or family counseling and / or coordination of care  A description of the counseling / coordination of care: patient education, treatment planning

## 2020-07-02 NOTE — ANESTHESIA PREPROCEDURE EVALUATION
Review of Systems/Medical History  Patient summary reviewed  Chart reviewed  No history of anesthetic complications     Cardiovascular  Exercise tolerance (METS): >4,     Pulmonary       GI/Hepatic            Endo/Other    Obesity    GYN       Hematology   Musculoskeletal       Neurology   Psychology           Physical Exam    Airway    Mallampati score: I  TM Distance: >3 FB  Neck ROM: full     Dental   upper dentures,     Cardiovascular      Pulmonary      Other Findings        Anesthesia Plan  ASA Score- 2 Emergent    Anesthesia Type- general with ASA Monitors  Additional Monitors:   Airway Plan: ETT  Plan Factors-    Induction- intravenous and rapid sequence induction  Postoperative Plan- Plan for postoperative opioid use  Planned trial extubation    Informed Consent- Anesthetic plan and risks discussed with patient  I personally reviewed this patient with the CRNA  Discussed and agreed on the Anesthesia Plan with the CRNA  Karrie Nissen

## 2020-07-02 NOTE — DISCHARGE SUMMARY
Discharge Summary - general surgery   Claudene Sierras 54 y o  male MRN: [id number]  Unit/Bed#: -02 Encounter: [id number]    Admission Date:   Admission Orders (From admission, onward)     Ordered        07/02/20 1310  Inpatient Admission  Once                     Admitting Diagnosis: Inguinal hernia [K40 90]    HPI:  51-year-old gentleman with worsening left groin pain secondary to an inguinal hernia  Patient unable to participate in daily activities of living in addition unable to work  Patient seen 's ER with severe left groin pain  Palpable hernia on exam   Given to time constraints and due to the fact the patient receive oral contrast has since 's use unable to undergo surgery at [address]  He subsequently then transferred to [facility] to undergo surgery  He is transferred directly to a bed  He was made NPO given IV fluids and prepared for surgery    Procedures Performed:  Left inguinal hernia pair with mesh    Hospital Course:  Patient was admitted to the general surgery service at [facility] for left groin pain secondary to left inguinal hernia  He underwent open left inguinal hernia pair without complication  It was noted that he had a direct and indirect component  Postoperatively he was placed on clears and advance diet as tolerated  He tolerated diet pain well controlled and patient was subsequently discharged on postoperative day 1  Significant Findings, Care, Treatment and Services Provided:  Repair of left inguinal hernia    Lab Results: I have personally reviewed pertinent lab results  Complications:  None    Discharge Diagnosis:  Left inguinal hernia    Resolved Problems  Date Reviewed: 7/2/2020    None          Condition at Discharge: good         Discharge instructions/Information to patient and family:   See after visit summary for information provided to patient and family        Provisions for Follow-Up Care:  See after visit summary for information related to follow-up care and any pertinent home health orders  Disposition: Home      Planned Readmission: No    Discharge Statement   I spent 20 minutes discharging the patient  This time was spent on the day of discharge  I had direct contact with the patient on the day of discharge  Additional documentation is required if more than 30 minutes were spent on discharge  Discharge Medications:  See after visit summary for reconciled discharge medications provided to patient and family

## 2020-07-02 NOTE — NURSING NOTE
Patient admitted to room 103-1 at this time, no complaints of n/v/d at this time, oriented to room and call bell  Patient aware of surgical procedure scheduled for this evening, given CHG preparation  IVF started and maintained at this time, patient has no complaints of chest pain or SOB at this time   Will continue to monitor for changes

## 2020-07-02 NOTE — ASSESSMENT & PLAN NOTE
Pleasant 53 yo M without significant PMH presenting for acute on chronic pain secondary to fat containing LIH  Patient seen and evaluated in [facility] ER by Dr Maddy Urrutia, transferred due to after hour OR protocols to [address]  Clinically stable, pain persistent, but controlled, no nausea, vomiting  Exam reveals asymptomatic umbilical hernia and tender fat containing left inguinal hernia  Afebrile, vital signs stable  CBC, CMP, INR all WNL, Covid negative  Plan:  Admit under Dr Maddy Urrutia  NPO, IVF, pain control  To go to OR today for left inguinal herniorrhaphy  Procedure, risks, benefits, and alternatives dicussed and questions answered  Dr Maddy Urrutia to obtain informed consent

## 2020-07-02 NOTE — ANESTHESIA POSTPROCEDURE EVALUATION
Post-Op Assessment Note    CV Status:  Stable  Pain Score: 0    Pain management: adequate     Mental Status:  Alert and awake   Hydration Status:  Euvolemic   PONV Controlled:  Controlled   Airway Patency:  Patent   Post Op Vitals Reviewed: Yes      Staff: CRNA           BP   131/74   Temp   97 7   Pulse  81   Resp   16   SpO2   97

## 2020-07-02 NOTE — H&P
H&P Exam - General Surgery   Chris Rivera 54 y o  male MRN: [id number]  Unit/Bed#: RM04 Encounter: [id number]    Assessment/Plan     Assessment:  Left lower quadrant abdominal and groin pain  Left inguinal hernia  Reducible umbilical hernia      Plan:  Admit to Dr Jorge A Ramos service observation status  Check INR  Covid pending  CT abdomen pelvis pending  Medicate PRN pain/nausea  IVF hydration  Monitor I/Os      History of Present Illness     HPI:  hCris Rivera is a 54 y o  male who presents with abdominal pain  That has been present for the past year  Pain is mainly left lower quadrant and groin  He is known to Dr Jorge A Ramos who saw him last year for left inguinal hernia  He as been toelrating his discomfort since that time  It is intermittent in nature and described as 7/10 and achy when present  He denies previous abdominal surgery  Denies nausea and vomiting  He is passing gas  Last BM was yesterday  Denies recent travel or sick contacts  Review of Systems   Constitutional: Negative  HENT: Negative  Eyes: Negative  Cardiovascular: Negative  Gastrointestinal: Positive for abdominal pain  Endocrine: Negative  Musculoskeletal: Negative  Skin: Negative  Neurological: Negative  Hematological: Negative  Psychiatric/Behavioral: Negative  Historical Information   History reviewed  No pertinent past medical history  History reviewed  No pertinent surgical history    Social History   Social History     Substance and Sexual Activity   Alcohol Use Never    Frequency: Never     Social History     Substance and Sexual Activity   Drug Use Never     Social History     Tobacco Use   Smoking Status Never Smoker   Smokeless Tobacco Never Used     E-Cigarette/Vaping    E-Cigarette Use Never User      E-Cigarette/Vaping Substances     Family History: non-contributory    Meds/Allergies   all medications and allergies reviewed  No Known Allergies    Objective   First Vitals: Blood Pressure: 163/81 (07/02/20 0923)  Pulse: 69 (07/02/20 0923)  Temperature: 98 3 °F (36 8 °C) (07/02/20 0923)  Temp Source: Temporal (07/02/20 0923)  Respirations: 18 (07/02/20 0923)  Height: 5' 7" (170 2 cm) (07/02/20 0787)  Weight - Scale: 84 9 kg (187 lb 2 7 oz) (07/02/20 0923)  SpO2: 96 % (07/02/20 0923)    Current Vitals:   Blood Pressure: (!) 178/92 (07/02/20 0930)  Pulse: 78 (07/02/20 0930)  Temperature: 98 3 °F (36 8 °C) (07/02/20 0923)  Temp Source: Temporal (07/02/20 6280)  Respirations: 18 (07/02/20 0930)  Height: 5' 7" (170 2 cm) (07/02/20 9238)  Weight - Scale: 84 9 kg (187 lb 2 7 oz) (07/02/20 0923)  SpO2: 97 % (07/02/20 0930)    No intake or output data in the 24 hours ending 07/02/20 1019    Invasive Devices     Peripheral Intravenous Line            Peripheral IV 07/02/20 Left Antecubital less than 1 day                Physical Exam   Constitutional: He is oriented to person, place, and time  He appears well-developed and well-nourished  No distress  HENT:   Head: Normocephalic and atraumatic  Right Ear: External ear normal    Left Ear: External ear normal    Nose: Nose normal    Mouth/Throat: Oropharynx is clear and moist    Eyes: Pupils are equal, round, and reactive to light  Conjunctivae and EOM are normal  Right eye exhibits no discharge  Left eye exhibits no discharge  Neck: Normal range of motion  Neck supple  No thyromegaly present  Cardiovascular: Normal rate, regular rhythm, normal heart sounds and intact distal pulses  No murmur heard  Pulmonary/Chest: Effort normal  No respiratory distress  Abdominal: Soft  Bowel sounds are normal  He exhibits no distension  There is no rebound and no guarding  Reducible umbilical hernia  Non reducible left inguinal hernia  Tender to palpation over both hernias   Musculoskeletal: Normal range of motion  He exhibits no edema or deformity  Neurological: He is alert and oriented to person, place, and time     Skin: Skin is warm and dry  He is not diaphoretic  Nursing note and vitals reviewed  Lab Results:   I have personally reviewed pertinent lab results  CBC:   Lab Results   Component Value Date    WBC 6 50 07/02/2020    HGB 13 4 07/02/2020    HCT 40 8 07/02/2020    MCV 90 07/02/2020     07/02/2020    MCH 29 5 07/02/2020    MCHC 32 8 07/02/2020    RDW 12 9 07/02/2020    MPV 9 7 07/02/2020    NRBC 0 07/02/2020     CMP:   Lab Results   Component Value Date    SODIUM 142 07/02/2020    K 3 7 07/02/2020     07/02/2020    CO2 29 07/02/2020    BUN 16 07/02/2020    CREATININE 0 89 07/02/2020    CALCIUM 8 9 07/02/2020    AST 20 07/02/2020    ALT 30 07/02/2020    ALKPHOS 40 (L) 07/02/2020    EGFR 96 07/02/2020   , Coagulation: No results found for: PT, INR, APTT  Imaging: I have personally reviewed pertinent reports  EKG, Pathology, and Other Studies: I have personally reviewed pertinent reports  Counseling / Coordination of Care  Total floor / unit time spent today 40 minutes  Greater than 50% of total time was spent with the patient and / or family counseling and / or coordination of care  A description of the counseling / coordination of care: review of labs and imaging, obtaining history and performing physical exam, discussion of findings and treatment options        Balbina Dela Cruz PA-C

## 2020-07-02 NOTE — ED PROVIDER NOTES
History  Chief Complaint   Patient presents with    Groin Pain     pt has had left sided groin pain for one year; no hx of hernia per pt     54year-old male presents with  Left groin pain and right lower back pain  He states that the discomfort has been present for a year but has worsened in the last few weeks  Patient was previously diagnosed with a left inguinal hernia and was seen by Dr José Luis Flynn in July 2019, he did not have surgery due to lack of insurance  The back pain is newer onset and is reported 8/10  Patient states that by the end of the day it is very painful to move at all  The hernia is a dull ache 4/10 unless he has been standing for a lengthy period of time which increases the discomfort to 8/10  At times the pain radiates down the leg  In the past three weeks he has taken daily Tylenol for the pain  Patient denies nausea, vomiting, diarrhea, constipation, fever, or chills  He is voiding and moving his bowels without issue  He presents today wearing supportive undergarments which he states helps to relieve the discomfort  He does not smoke  He states that he is "very nervous" and wants to feel better as he is a  and needs to be able to stand for long periods of time without pain  Patient states that he believes his right lower back pain is directly related to the 2 separate supporter that he is wearing on his abdomen and is inguinal region  He states when he does not wear these he does not experience the back pain  Again no vomiting  Producing urine and stool without difficulty  He states he is able to reduce the left inguinal hernia however within 5 minutes of returns typically  Prior to Admission Medications   Prescriptions Last Dose Informant Patient Reported? Taking?   ibuprofen (MOTRIN) 200 mg tablet   Yes No   Sig: Take by mouth every 6 (six) hours as needed for mild pain      Facility-Administered Medications: None       History reviewed   No pertinent past medical history  History reviewed  No pertinent surgical history  Family History   Family history unknown: Yes     I have reviewed and agree with the history as documented  E-Cigarette/Vaping    E-Cigarette Use Never User      E-Cigarette/Vaping Substances     Social History     Tobacco Use    Smoking status: Never Smoker    Smokeless tobacco: Never Used   Substance Use Topics    Alcohol use: Never     Frequency: Never    Drug use: Never       Review of Systems   Constitutional: Negative  Negative for activity change, appetite change, chills, diaphoresis, fatigue, fever and unexpected weight change  HENT: Negative  Negative for sore throat, trouble swallowing and voice change  Eyes: Negative  Respiratory: Negative  Negative for cough, chest tightness, shortness of breath and wheezing  Cardiovascular: Negative  Negative for chest pain, palpitations and leg swelling  Gastrointestinal: Positive for abdominal pain  Negative for abdominal distention, anal bleeding, blood in stool, constipation, diarrhea, nausea, rectal pain and vomiting  Endocrine: Negative  Genitourinary: Negative  Negative for flank pain and hematuria  Musculoskeletal: Positive for back pain  Negative for arthralgias, gait problem, joint swelling, myalgias, neck pain and neck stiffness  Skin: Negative  Negative for rash and wound  Allergic/Immunologic: Negative  Neurological: Negative  Negative for dizziness, seizures, syncope, weakness, light-headedness and headaches  Hematological: Negative  Psychiatric/Behavioral: Negative  All other systems reviewed and are negative  Physical Exam  Physical Exam   Constitutional: He is oriented to person, place, and time  Vital signs are normal  He appears well-developed and well-nourished  He does not have a sickly appearance  He does not appear ill  No distress  HENT:   Right Ear: External ear normal  No swelling  Tympanic membrane is not bulging     Left Ear: External ear normal  No swelling  Tympanic membrane is not bulging  Nose: Nose normal    Mouth/Throat: Oropharynx is clear and moist  No oropharyngeal exudate  Eyes: Pupils are equal, round, and reactive to light  Conjunctivae, EOM and lids are normal    Neck: Normal range of motion  Neck supple  No JVD present  No tracheal deviation, no edema and normal range of motion present  No thyromegaly present  Cardiovascular: Normal rate, regular rhythm, normal heart sounds, intact distal pulses and normal pulses  Exam reveals no gallop and no friction rub  No murmur heard  Pulmonary/Chest: Effort normal and breath sounds normal  No stridor  No respiratory distress  He has no wheezes  He has no rales  He exhibits no tenderness  Abdominal: Soft  Bowel sounds are normal  He exhibits no distension and no mass  There is tenderness  There is no rebound, no guarding and negative Bailey's sign  A hernia is present  Palpable and reducible left inguinal hernia there is no testicular tenderness or swelling  Musculoskeletal: Normal range of motion  He exhibits no edema or tenderness  Lymphadenopathy:     He has no cervical adenopathy  Neurological: He is alert and oriented to person, place, and time  He has normal strength and normal reflexes  No cranial nerve deficit or sensory deficit  GCS eye subscore is 4  GCS verbal subscore is 5  GCS motor subscore is 6  Skin: Skin is warm and dry  Capillary refill takes less than 2 seconds  No rash noted  He is not diaphoretic  No erythema  No pallor  Psychiatric: He has a normal mood and affect  His speech is normal and behavior is normal    Vitals reviewed        Vital Signs  ED Triage Vitals [07/02/20 0923]   Temperature Pulse Respirations Blood Pressure SpO2   98 3 °F (36 8 °C) 69 18 163/81 96 %      Temp Source Heart Rate Source Patient Position - Orthostatic VS BP Location FiO2 (%)   Temporal Monitor Lying Left arm --      Pain Score       9           Vitals: 07/02/20 0923 07/02/20 0930 07/02/20 1100 07/02/20 1115   BP: 163/81 (!) 178/92 158/82 165/85   Pulse: 69 78 56 65   Patient Position - Orthostatic VS: Lying Lying Lying Lying         Visual Acuity      ED Medications  Medications   HYDROmorphone (DILAUDID) injection 0 5 mg (has no administration in time range)   ondansetron (ZOFRAN) injection 4 mg (has no administration in time range)   lactated ringers infusion (100 mL/hr Intravenous New Bag 7/2/20 1135)   sodium chloride 0 9 % infusion (has no administration in time range)   ceFAZolin (ANCEF) IVPB (premix) 2,000 mg 50 mL (0 mg Intravenous Stopped 7/2/20 1206)   iohexol (OMNIPAQUE) 350 MG/ML injection (MULTI-DOSE) 100 mL (100 mL Intravenous Given 7/2/20 1130)   iohexol (OMNIPAQUE) 240 MG/ML solution 50 mL (50 mL Oral Given 7/2/20 1130)       Diagnostic Studies  Results Reviewed     Procedure Component Value Units Date/Time    Protime-INR [[id number]]  (Normal) Collected:  07/02/20 1113    Lab Status:  Final result Specimen:  Blood from Arm, Left Updated:  07/02/20 1128     Protime 13 7 seconds      INR 1 05    Novel Coronavirus (Covid-19),PCR [facility] [[id number]]  (Normal) Collected:  07/02/20 1012    Lab Status:  Final result Specimen:  Nares from Nose Updated:  07/02/20 1116     SARS-CoV-2 Negative    Narrative: The specimen collection materials, transport medium, and/or testing methodology utilized in the production of these test results have been proven to be reliable in a limited validation with an abbreviated program under the Emergency Utilization Authorization provided by the FDA  Testing reported as "Presumptive positive" will be confirmed with secondary testing with a reference laboratory to ensure result accuracy  Clinical caution and judgement should be used with the interpretation of these results with consideration of the clinical impression and other laboratory testing    Testing reported as "Positive" or "Negative" has been proven to be accurate according to standard laboratory validation requirements  All testing is performed with control materials showing appropriate reactivity at standard intervals        Comprehensive metabolic panel [[id number]]  (Abnormal) Collected:  07/02/20 0959    Lab Status:  Final result Specimen:  Blood from Arm, Left Updated:  07/02/20 1022     Sodium 142 mmol/L      Potassium 3 7 mmol/L      Chloride 105 mmol/L      CO2 29 mmol/L      ANION GAP 8 mmol/L      BUN 16 mg/dL      Creatinine 0 89 mg/dL      Glucose 97 mg/dL      Calcium 8 9 mg/dL      AST 20 U/L      ALT 30 U/L      Alkaline Phosphatase 40 U/L      Total Protein 7 8 g/dL      Albumin 4 0 g/dL      Total Bilirubin 0 90 mg/dL      eGFR 96 ml/min/1 73sq m     Narrative:       National Kidney Disease Foundation guidelines for Chronic Kidney Disease (CKD):     Stage 1 with normal or high GFR (GFR > 90 mL/min/1 73 square meters)    Stage 2 Mild CKD (GFR = 60-89 mL/min/1 73 square meters)    Stage 3A Moderate CKD (GFR = 45-59 mL/min/1 73 square meters)    Stage 3B Moderate CKD (GFR = 30-44 mL/min/1 73 square meters)    Stage 4 Severe CKD (GFR = 15-29 mL/min/1 73 square meters)    Stage 5 End Stage CKD (GFR <15 mL/min/1 73 square meters)  Note: GFR calculation is accurate only with a steady state creatinine    CBC and differential [[id number]] Collected:  07/02/20 0959    Lab Status:  Final result Specimen:  Blood from Arm, Left Updated:  07/02/20 1004     WBC 6 50 Thousand/uL      RBC 4 55 Million/uL      Hemoglobin 13 4 g/dL      Hematocrit 40 8 %      MCV 90 fL      MCH 29 5 pg      MCHC 32 8 g/dL      RDW 12 9 %      MPV 9 7 fL      Platelets 319 Thousands/uL      nRBC 0 /100 WBCs      Neutrophils Relative 64 %      Immat GRANS % 0 %      Lymphocytes Relative 27 %      Monocytes Relative 7 %      Eosinophils Relative 1 %      Basophils Relative 1 %      Neutrophils Absolute 4 17 Thousands/µL      Immature Grans Absolute 0 02 Thousand/uL      Lymphocytes Absolute 1 77 Thousands/µL      Monocytes Absolute 0 46 Thousand/µL      Eosinophils Absolute 0 04 Thousand/µL      Basophils Absolute 0 04 Thousands/µL                  CT abdomen pelvis with contrast   Final Result by Huy Sun MD (07/02 1156)      No acute intra-abdominal pathology  Uncomplicated colonic diverticulosis  Small fat-containing umbilical hernia  Workstation performed: [id number]                    Procedures  Procedures         ED Course  ED Course as of Jul 02 1239   Thu Jul 02, 2020   4511 Blood Pressure: 163/81   0929 Pulse: 69   0929 Respirations: 18   0929 SpO2: 96 %   1002 Blood Pressure(!): 178/92   1002 Temperature: 98 3 °F (36 8 °C)   1002 Pulse: 78   1002 Respirations: 18   1002 SpO2: 97 %   1024 Sodium: 142   1024 Potassium: 3 7   1024 Chloride: 105   1024 BUN: 16   1025 eGFR: 96   1025 WBC: 6 50   1025 Hemoglobin: 13 4   1025 Platelet Count: 319   1135 EXT SARS-COV-2: Negative   1236 No OR availability today here at [facility], Dr Tori Brewster accepts, will send to [place]  US AUDIT      Most Recent Value   Initial Alcohol Screen: US AUDIT-C    1  How often do you have a drink containing alcohol?  0 Filed at: 07/02/2020 0925   2  How many drinks containing alcohol do you have on a typical day you are drinking? 0 Filed at: 07/02/2020 0925   3a  Male UNDER 65: How often do you have five or more drinks on one occasion? 0 Filed at: 07/02/2020 0925   Audit-C Score  0 Filed at: 07/02/2020 6856                  FAZAL/DAST-10      Most Recent Value   How many times in the past year have you    Used an illegal drug or used a prescription medication for non-medical reasons?   Never Filed at: 07/02/2020 0147                                MDM      Disposition  Final diagnoses:   Left inguinal hernia     Time reflects when diagnosis was documented in both MDM as applicable and the Disposition within this note     Time User Action Codes Description Comment    7/2/2020 10:25 AM [place] Ellan Peppers Add [K40 68] Left inguinal hernia       ED Disposition     ED Disposition Condition Date/Time Comment    Transfer to Another Facility-In Network  Thu Jul 2, 2020 12:35 PM Case was discussed with Dr Claudine Davis and the patient's admission status was agreed to be Admission Status: observation status to the service of Dr Claudine Davis  Follow-up Information    None         Patient's Medications   Discharge Prescriptions    No medications on file     No discharge procedures on file      PDMP Review     None          ED Provider  Electronically Signed by           Lennie Martinez PA-C  07/02/20 4950

## 2020-07-02 NOTE — ED NOTES
Dr Trina Elaine at bedside     Jodi Morris, [facility]  07/02/20 5650

## 2020-07-02 NOTE — DISCHARGE INSTRUCTIONS
Follow-up with Dr Leona Breen in 2 weeks  Please call Aisha Munir at [phone]  Regular diet as tolerated  Low intensity activity as tolerated, no heavy lifting nothing greater than 14 lb for 4-6 weeks  Your incision was closed with surgical glue  You may shower on tomorrow  No baths or swimming  If developed fever, nausea vomiting, worsening pain, redness or drainage from the incision then call the office or go to the ER  Hernia inguinal   LO QUE NECESITA SABER:   Ami hernia inguinal ocurre cuando los órganos o el tejido abdominal se forzan a través de un área débil de la pared abdominal y pasan a través de ésta  La pared abdominal está formada de grasa y músculo  Eso mantiene a los intestinos en murray lugar  La hernia podría contener líquido, tejido del abdomen o parte de un órgano (zac un intestino)  INSTRUCCIONES SOBRE EL MIRZA HOSPITALARIA:   Busque atención médica de inmediato si:   · Usted tiene un intenso dolor abdominal con náuseas y vómitos  · Murray abdomen está más wally de lo normal      · Murray hernia se hace más wally o se ve morada o tobias  · Usted ve bessie en vandana deposiciones  · Usted se siente mareado, débil o tiene sensación de [place]  Pregúntele a murray Maxcine Kelin vitaminas y minerales son adecuados para usted  · Usted tiene fiebre  · Usted tiene preguntas o inquietudes acerca de murray condición o cuidado  Medicamento:  Usted podría  necesitar lo siguiente:  · AINEs (Analgésicos antiinflamatorios no esteroides) zac el ibuprofeno, ayudan a disminuir la inflamación, el dolor y la fiebre  Los AINEs pueden causar sangrado estomacal o problemas renales en ciertas personas  Si usted tad un medicamento anticoagulante, siempre pregúntele a murray médico si los MYA son seguros para usted  Siempre juve la etiqueta de otilio medicamento y [place] instrucciones  · [place] vandana medicamentos zac se le haya indicado    Consulte con murray médico si usted delroy que murray medicamento no le está ayudando o si presenta efectos secundarios  Infórmele si es alérgico a cualquier medicamento  Mantenga soraya lista actualizada de los [address] [facility], las vitaminas y los productos herbales que tad  Incluya los siguientes datos de los medicamentos: cantidad, frecuencia y motivo de administración  Traiga con usted la lista o los envases de la píldoras a vandana citas de seguimiento  Lleve la lista de los medicamentos con usted en edda de soraya emergencia  Acuda a vandana consultas de control con robb médico según le indicaron  Anote vandana preguntas para que se acuerde de hacerlas michael vandana visitas  Controle vandana síntomas y evite otra hernia:   · No levante nada pesado  El levantar cosas pesadas puede empeorar robb hernia o provocar otra  Consulte con robb médico cuánto peso puede usted levantar sin riesgo  · [address] se le haya indicado  Los líquidos podrían evitar el estreñimiento y el esfuerzo michael soraya evacuación intestinal  Pregunte cuánto líquido debe steph cada día y cuáles líquidos son los más adecuados para usted  · [place] alimentos ricos en fibras  La fibra podría evitar el estreñimiento y el esfuerzo michael soraya evacuación intestinal  Los alimentos que contienen fibra incluyen a las frutas, verduras, frijoles, lentejas y granos enteros  · Mantenga un peso saludable  Si usted tiene sobrepeso, la pérdida de peso podría evitar que robb hernia empeore  También podría evitar el desarrollo de otra hernia  Hable con robb médico acerca del ejercicio y cómo perder peso de manera ferro si tiene sobrepeso  · No fume  La nicotina y otros químicos en los cigarrillos y los cigarros pueden debilitar robb pared abdominal  [place] podría aumentar robb riesgo de desarrollar otra hernia  Pida información a robb médico si usted actualmente fuma y necesita ayuda para dejar de fumar  Los cigarrillos electrónicos o tabaco sin humo todavía contienen nicotina  Consulte con robb médico antes de [employer]    © 2017 [address]  Information is for End User's use only and may not be sold, redistributed or otherwise used for commercial purposes  All illustrations and images included in CareNotes® are the copyrighted property of [employer]  or Jayro King  Esta información es sólo para uso en educación  Robb intención no es darle un consejo médico sobre enfermedades o tratamientos  Colsulte con robb Angélica Bourdon farmacéutico antes de seguir cualquier régimen médico para saber si es seguro y efectivo para usted

## 2020-07-02 NOTE — OP NOTE
OPERATIVE REPORT  PATIENT NAME: Shelia Sepulveda    :  1965  MRN: [id number]  Pt Location: [address] [place] [address] OR ROOM 03    SURGERY DATE: 2020    Surgeon(s) and Role: * Sheldon Shah,  - Primary     * Tierney Aguirre PA-C - Assisting    Preop Diagnosis:  Inguinal hernia of left side without obstruction or gangrene [K40 90]    Post-Op Diagnosis Codes:     * Inguinal hernia of left side without obstruction or gangrene [K40 90]    Procedure(s) (LRB):  REPAIR HERNIA INGUINAL (Left)    Specimen(s):  * No specimens in log *    Estimated Blood Loss:   50 mL    Drains:  * No LDAs found *    Anesthesia Type:   General    Operative Indications:  Inguinal hernia of left side without obstruction or gangrene [K40 90]      Operative Findings:  Large pantaloon hernia containing fat  Large cord lipoma    Complications:   None    Procedure and Technique:  The patient was taken to the operating arena and placed in supine position on operating table  All irregular monitoring devices were connected  Gen  Anesthesia was induced and the patient underwent uncomplicated LMA intubation  Perioperative antibiotics were administered  Subcutaneous heparin along with bilateral lower extremity sequential compression devices were placed for DVT prophylaxis  The patient was then prepped and draped in usual sterile fashion  A timeout was performed to verify correct patient, site, procedure, and positioning  Landmarks were then identified including the left ASIS and pubic tubercle  An incision was marked in a natural skin crease approximately 3 fingerbreadths above theleft groin crease, and planned to end near the pubic tubercle  The skin crease incision was made with a 15 blade scalpel and deepened through Cesia's and Camper's fascia with electrocautery until the aponeurosis of the external oblique was encountered  This was cleaned and the external ring was exposed  Hemostasis was achieved in the wound   At this point it was noted the hernia to be bulging through the internal ring  Incision was made in the midportion of the external oblique aponeurosis in the direction of its fibersl  The ilioinguinal nerve was identified and protected  Flaps of the external oblique were developed cephalad and inferiorly  The hernia and cord structures were carefully freed up from the shelving edge and the conjoined tendon  The cord structures including the vas deferens and surrounding vasculature was then identified and Penrose drain was then placed around them  The initial hernia sac was then carefully dissected free of the cord structures using both blunt and sharp technique  Once the hernia sac was completely dissected it was then reduced into the intraabdominal cavity, and this was found to be in the superior medial portion of the inguinal floor, a rather large direct defect  Furthermore, as dissection continued along the cord there also appeared to be separate indirect hernia sac and a large cord lipoma  Both of these were dissected free and also reduced back into the abdominal cavity  Hemostasis was achieved at this point  Attention was then focused on repair the hernia  The floor of the inguinal canal was palpated and appeared to be weak, as stated above there appeared to be a direct hernia involving the floor with a defect with a hernia sac containing fat     The floor of the inguinal canal was oversewn with 2-0 Vicryl and the direct hernia reduced A medium mesh plug was then inserted into the hernia defect just inferior to the cord  The mesh was then secured to the conjoined tendon in addition to the shelving edge using 2-0 Vicryl sutures  Once this repair was completed the floor of the canal was then reinforced using a mesh onlay  The mesh was secured at the pubic tubercle, in addition to multiple spots along the conjoined tendon and the shelving edge using interrupted 2-0 Vicryl sutures   The mesh was then cut in a fashion to allow for re-creation of the internal ring  The tails of the mesh were then secured using 2-0 Vicryl and care was taken that there was adequate room for the cord structures so that there was no impingement on the cord, and that the surgeon's fifth fingertip could fit between the cord and the mesh  Inguinal canal was then irrigated with warm normal saline  Hemostasis was secured  The external oblique fascia was then approximated using running 3-0 Vicryl suture  The wound was then again irrigated with warm normal saline  Cesia's fascia was then approximated with interrupted 3-0 Vicryl sutures  Skin was then closed with 4-0 Monocryl in a running fashion  The testes were gently pulled down to anatomic position and scrotum  The patient tolerated procedure well was taken to the postanesthesia care unit in stable condition  All sponge, needle, and history counts were correct       I was present for the entire procedure, A qualified resident physician was not available and A physician assistant was required during the procedure for retraction tissue handling,dissection and suturing    Patient Disposition:  PACU     SIGNATURE: Heather Flower DO  DATE: July 2, 2020  TIME: 6:41 PM

## 2020-07-02 NOTE — PLAN OF CARE
Problem: PAIN - ADULT  Goal: Verbalizes/displays adequate comfort level or baseline comfort level  Description  Interventions:  - Encourage patient to monitor pain and request assistance  - Assess pain using appropriate pain scale  - Administer analgesics based on type and severity of pain and evaluate response  - Implement non-pharmacological measures as appropriate and evaluate response  - Consider cultural and social influences on pain and pain management  - Notify physician/advanced practitioner if interventions unsuccessful or patient reports new pain  Outcome: Progressing     Problem: INFECTION - ADULT  Goal: Absence or prevention of progression during hospitalization  Description  INTERVENTIONS:  - Assess and monitor for signs and symptoms of infection  - Monitor lab/diagnostic results  - Monitor all insertion sites, i e  indwelling lines, tubes, and drains  - Monitor endotracheal if appropriate and nasal secretions for changes in amount and color  - [place] appropriate cooling/warming therapies per order  - Administer medications as ordered  - Instruct and encourage patient and family to use good hand hygiene technique  - Identify and instruct in appropriate isolation precautions for identified infection/condition  Outcome: Progressing  Goal: Absence of fever/infection during neutropenic period  Description  INTERVENTIONS:  - Monitor WBC    Outcome: Progressing     Problem: SAFETY ADULT  Goal: Patient will remain free of falls  Description  INTERVENTIONS:  - Assess patient frequently for physical needs  -  Identify cognitive and physical deficits and behaviors that affect risk of falls    -  [place] fall precautions as indicated by assessment   - Educate patient/family on patient safety including physical limitations  - Instruct patient to call for assistance with activity based on assessment  - Modify environment to reduce risk of injury  - Consider OT/PT consult to assist with strengthening/mobility  Outcome: Progressing  Goal: Maintain or return to baseline ADL function  Description  INTERVENTIONS:  -  Assess patient's ability to carry out ADLs; assess patient's baseline for ADL function and identify physical deficits which impact ability to perform ADLs (bathing, care of mouth/teeth, toileting, grooming, dressing, etc )  - Assess/evaluate cause of self-care deficits   - Assess range of motion  - Assess patient's mobility; develop plan if impaired  - Assess patient's need for assistive devices and provide as appropriate  - Encourage maximum independence but intervene and supervise when necessary  - Involve family in performance of ADLs  - Assess for home care needs following discharge   - Consider OT consult to assist with ADL evaluation and planning for discharge  - Provide patient education as appropriate  Outcome: Progressing  Goal: Maintain or return mobility status to optimal level  Description  INTERVENTIONS:  - Assess patient's baseline mobility status (ambulation, transfers, stairs, etc )    - Identify cognitive and physical deficits and behaviors that affect mobility  - Identify mobility aids required to assist with transfers and/or ambulation (gait belt, sit-to-stand, lift, walker, cane, etc )  - [place] fall precautions as indicated by assessment  - Record patient progress and toleration of activity level on Mobility SBAR; progress patient to next Phase/Stage  - Instruct patient to call for assistance with activity based on assessment  - Consider rehabilitation consult to assist with strengthening/weightbearing, etc   Outcome: Progressing     Problem: DISCHARGE PLANNING  Goal: Discharge to home or other facility with appropriate resources  Description  INTERVENTIONS:  - Identify barriers to discharge w/patient and caregiver  - Arrange for needed discharge resources and transportation as appropriate  - Identify discharge learning needs (meds, wound care, etc )  - Arrange for interpretive services to assist at discharge as needed  - Refer to Case Management Department for coordinating discharge planning if the patient needs post-hospital services based on physician/advanced practitioner order or complex needs related to functional status, cognitive ability, or social support system  Outcome: Progressing     Problem: Knowledge Deficit  Goal: Patient/family/caregiver demonstrates understanding of disease process, treatment plan, medications, and discharge instructions  Description  Complete learning assessment and assess knowledge base    Interventions:  - Provide teaching at level of understanding  - Provide teaching via preferred learning methods  Outcome: Progressing

## 2020-07-03 VITALS
SYSTOLIC BLOOD PRESSURE: 110 MMHG | OXYGEN SATURATION: 96 % | RESPIRATION RATE: 18 BRPM | DIASTOLIC BLOOD PRESSURE: 59 MMHG | TEMPERATURE: 98.4 F | BODY MASS INDEX: 28.84 KG/M2 | HEIGHT: 67 IN | WEIGHT: 183.75 LBS | HEART RATE: 70 BPM

## 2020-07-03 PROCEDURE — 99024 POSTOP FOLLOW-UP VISIT: CPT | Performed by: SURGERY

## 2020-07-03 RX ADMIN — OXYCODONE HYDROCHLORIDE 10 MG: 10 TABLET ORAL at 06:01

## 2020-07-03 RX ADMIN — CEFAZOLIN SODIUM 2000 MG: 2 SOLUTION INTRAVENOUS at 00:40

## 2020-07-03 RX ADMIN — HEPARIN SODIUM 5000 UNITS: 5000 INJECTION INTRAVENOUS; SUBCUTANEOUS at 05:57

## 2020-07-03 RX ADMIN — CEFAZOLIN SODIUM 2000 MG: 2 SOLUTION INTRAVENOUS at 08:53

## 2020-07-03 RX ADMIN — SODIUM CHLORIDE 125 ML/HR: 0.9 INJECTION, SOLUTION INTRAVENOUS at 04:10

## 2020-07-03 NOTE — PLAN OF CARE
Problem: PAIN - ADULT  Goal: Verbalizes/displays adequate comfort level or baseline comfort level  Description  Interventions:  - Encourage patient to monitor pain and request assistance  - Assess pain using appropriate pain scale  - Administer analgesics based on type and severity of pain and evaluate response  - Implement non-pharmacological measures as appropriate and evaluate response  - Consider cultural and social influences on pain and pain management  - Notify physician/advanced practitioner if interventions unsuccessful or patient reports new pain  Outcome: Progressing     Problem: INFECTION - ADULT  Goal: Absence or prevention of progression during hospitalization  Description  INTERVENTIONS:  - Assess and monitor for signs and symptoms of infection  - Monitor lab/diagnostic results  - Monitor all insertion sites, i e  indwelling lines, tubes, and drains  - Monitor endotracheal if appropriate and nasal secretions for changes in amount and color  - [place] appropriate cooling/warming therapies per order  - Administer medications as ordered  - Instruct and encourage patient and family to use good hand hygiene technique  - Identify and instruct in appropriate isolation precautions for identified infection/condition  Outcome: Progressing  Goal: Absence of fever/infection during neutropenic period  Description  INTERVENTIONS:  - Monitor WBC    Outcome: Progressing     Problem: SAFETY ADULT  Goal: Patient will remain free of falls  Description  INTERVENTIONS:  - Assess patient frequently for physical needs  -  Identify cognitive and physical deficits and behaviors that affect risk of falls    -  [place] fall precautions as indicated by assessment   - Educate patient/family on patient safety including physical limitations  - Instruct patient to call for assistance with activity based on assessment  - Modify environment to reduce risk of injury  - Consider OT/PT consult to assist with strengthening/mobility  Outcome: Progressing  Goal: Maintain or return to baseline ADL function  Description  INTERVENTIONS:  -  Assess patient's ability to carry out ADLs; assess patient's baseline for ADL function and identify physical deficits which impact ability to perform ADLs (bathing, care of mouth/teeth, toileting, grooming, dressing, etc )  - Assess/evaluate cause of self-care deficits   - Assess range of motion  - Assess patient's mobility; develop plan if impaired  - Assess patient's need for assistive devices and provide as appropriate  - Encourage maximum independence but intervene and supervise when necessary  - Involve family in performance of ADLs  - Assess for home care needs following discharge   - Consider OT consult to assist with ADL evaluation and planning for discharge  - Provide patient education as appropriate  Outcome: Progressing  Goal: Maintain or return mobility status to optimal level  Description  INTERVENTIONS:  - Assess patient's baseline mobility status (ambulation, transfers, stairs, etc )    - Identify cognitive and physical deficits and behaviors that affect mobility  - Identify mobility aids required to assist with transfers and/or ambulation (gait belt, sit-to-stand, lift, walker, cane, etc )  - [place] fall precautions as indicated by assessment  - Record patient progress and toleration of activity level on Mobility SBAR; progress patient to next Phase/Stage  - Instruct patient to call for assistance with activity based on assessment  - Consider rehabilitation consult to assist with strengthening/weightbearing, etc   Outcome: Progressing     Problem: DISCHARGE PLANNING  Goal: Discharge to home or other facility with appropriate resources  Description  INTERVENTIONS:  - Identify barriers to discharge w/patient and caregiver  - Arrange for needed discharge resources and transportation as appropriate  - Identify discharge learning needs (meds, wound care, etc )  - Arrange for interpretive services to assist at discharge as needed  - Refer to Case Management Department for coordinating discharge planning if the patient needs post-hospital services based on physician/advanced practitioner order or complex needs related to functional status, cognitive ability, or social support system  Outcome: Progressing     Problem: Knowledge Deficit  Goal: Patient/family/caregiver demonstrates understanding of disease process, treatment plan, medications, and discharge instructions  Description  Complete learning assessment and assess knowledge base    Interventions:  - Provide teaching at level of understanding  - Provide teaching via preferred learning methods  Outcome: Progressing     Problem: Prexisting or High Potential for Compromised Skin Integrity  Goal: Skin integrity is maintained or improved  Description  INTERVENTIONS:  - Identify patients at risk for skin breakdown  - Assess and monitor skin integrity  - Assess and monitor nutrition and hydration status  - Monitor labs   - Assess for incontinence   - Turn and reposition patient  - Assist with mobility/ambulation  - Relieve pressure over bony prominences  - Avoid friction and shearing  - Provide appropriate hygiene as needed including keeping skin clean and dry  - Evaluate need for skin moisturizer/barrier cream  - Collaborate with interdisciplinary team   - Patient/family teaching  - Consider wound care consult   Outcome: Progressing

## 2020-07-03 NOTE — INCIDENTAL FINDINGS
The following findings require follow up:  Radiographic finding   Finding:  Umbilical hernia   Follow up required:  Surgical consultation with Dr Porsche Morales   Follow up should be done within 1 month(s)    Please notify the following clinician to assist with the follow up:   Dr Charlette Carrillo

## 2020-07-03 NOTE — NURSING NOTE
Patient medicated twice throughout the night for complaints of L inguinal pain  Patient initially medicated with morphoine, which took the patient's  Pain from a 7 to 5/10 on pain scale  Patient then medicated with po pain medication which has resulted  In him being pain free for the rest of the night  Will continue to monitor

## 2020-07-03 NOTE — NURSING NOTE
Surgery in to see patient, ok for d/c, iv site removed, avs reviewed with patient, all questions/concerns answered at this time  Medication education given regarding narcotic medications for pain  Patient d/c to main entrance via w/c with PCA at this time

## 2020-07-03 NOTE — DISCHARGE SUMMARY
Discharge Summary - Neda Sarmiento 54 y o  male MRN: [id number]    Unit/Bed#: -02 Encounter: [id number]    Admission Date:   Admission Orders (From admission, onward)     Ordered        07/02/20 1310  Inpatient Admission  Once                     Admitting Diagnosis: Inguinal hernia [K40 90]    HPI:  Patient admitted with a symptomatic left inguinal hernia for which definitive treatment during this hospitalization is indicated  Procedures Performed: No orders of the defined types were placed in this encounter  Summary of Hospital Course:  Patient underwent a left inguinal herniorrhaphy with mesh by Dr Luz Harmon  Patient convalesced 1 hospital night  On the morning of Friday July 3, 2020 the patient is tolerating his diet, ambulating independently  Patient pain controlled with oral medications and he was discharged home in stable condition  Significant Findings, Care, Treatment and Services Provided:  Left inguinal hernia    Complications:  None    Discharge Diagnosis:  Left inguinal hernia    Resolved Problems  Date Reviewed: 7/3/2020    None          Condition at Discharge: good         Discharge instructions/Information to patient and family:   See after visit summary for information provided to patient and family  Provisions for Follow-Up Care:  See after visit summary for information related to follow-up care and any pertinent home health orders  PCP: Yosi Garcia PA-C    Disposition: Home    Planned Readmission: No      Discharge Statement   I spent 30 minutes discharging the patient  This time was spent on the day of discharge  I had direct contact with the patient on the day of discharge  Additional documentation is required if more than 30 minutes were spent on discharge  Discharge Medications:  See after visit summary for reconciled discharge medications provided to patient and family

## 2020-07-08 ENCOUNTER — TELEPHONE (OUTPATIENT)
Dept: SURGERY | Facility: CLINIC | Age: 55
End: 2020-07-08

## 2020-07-08 ENCOUNTER — HOSPITAL ENCOUNTER (EMERGENCY)
Facility: HOSPITAL | Age: 55
Discharge: HOME | End: 2020-07-08
Attending: EMERGENCY MEDICINE

## 2020-07-08 ENCOUNTER — APPOINTMENT (EMERGENCY)
Dept: NON INVASIVE DIAGNOSTICS | Facility: HOSPITAL | Age: 55
End: 2020-07-08

## 2020-07-08 VITALS
WEIGHT: 189.6 LBS | BODY MASS INDEX: 29.69 KG/M2 | DIASTOLIC BLOOD PRESSURE: 84 MMHG | SYSTOLIC BLOOD PRESSURE: 141 MMHG | OXYGEN SATURATION: 98 % | TEMPERATURE: 98.3 F | HEART RATE: 61 BPM | RESPIRATION RATE: 16 BRPM

## 2020-07-08 DIAGNOSIS — M79.662 PAIN OF LEFT CALF: Primary | ICD-10-CM

## 2020-07-08 DIAGNOSIS — Z87.19 STATUS POST INGUINAL HERNIA REPAIR: ICD-10-CM

## 2020-07-08 DIAGNOSIS — Z98.890 STATUS POST INGUINAL HERNIA REPAIR: ICD-10-CM

## 2020-07-08 LAB
ANION GAP SERPL CALCULATED.3IONS-SCNC: 6 MMOL/L (ref 4–13)
BUN SERPL-MCNC: 13 MG/DL (ref 5–25)
CALCIUM SERPL-MCNC: 8.9 MG/DL (ref 8.3–10.1)
CHLORIDE SERPL-SCNC: 102 MMOL/L (ref 100–108)
CO2 SERPL-SCNC: 27 MMOL/L (ref 21–32)
CREAT SERPL-MCNC: 0.93 MG/DL (ref 0.6–1.3)
GFR SERPL CREATININE-BSD FRML MDRD: 92 ML/MIN/1.73SQ M
GLUCOSE SERPL-MCNC: 106 MG/DL (ref 65–140)
POTASSIUM SERPL-SCNC: 3.6 MMOL/L (ref 3.5–5.3)
SODIUM SERPL-SCNC: 135 MMOL/L (ref 136–145)

## 2020-07-08 PROCEDURE — 99284 EMERGENCY DEPT VISIT MOD MDM: CPT

## 2020-07-08 PROCEDURE — 93971 EXTREMITY STUDY: CPT

## 2020-07-08 PROCEDURE — 99284 EMERGENCY DEPT VISIT MOD MDM: CPT | Performed by: EMERGENCY MEDICINE

## 2020-07-08 PROCEDURE — 80048 BASIC METABOLIC PNL TOTAL CA: CPT | Performed by: EMERGENCY MEDICINE

## 2020-07-08 PROCEDURE — 36415 COLL VENOUS BLD VENIPUNCTURE: CPT | Performed by: EMERGENCY MEDICINE

## 2020-07-08 PROCEDURE — 93971 EXTREMITY STUDY: CPT | Performed by: SURGERY

## 2020-07-08 NOTE — TELEPHONE ENCOUNTER
Pt friend called post op day 6 pt having pain in calf developed pain on Monday, spoke with Dr Dwayne Bernal who also spoke with patient's friend agreed to come in to ER to rule out DVT

## 2020-07-08 NOTE — ED PROVIDER NOTES
History  Chief Complaint   Patient presents with    Leg Pain     pt c/o left leg pain for the past three days since monday morning     80-year-old male with no pertinent past medical history who is presenting left calf pain  Patient states that he started experiencing this pain on July 6th  He does not recall an injury or other precipitating factor for the pain  The pain is located in the left calf  Patient describes the pain as sharp  He has not taken any medications for the pain  He did try to apply a cream that he bought in [facility] for this pain but this did not help  Patient reports that he not have the pain at rest   He starts feeling the pain when attempts to ambulate  However, the pain then goes away after he ambulates for some time  However, when he stops, the pain returns  Patient has never had similar pain  Patient was just seen here on July 2nd for a symptomatic left inguinal hernia  He was transferred for operative repair  He was discharged the next day  Patient states that he has been recovering well since the procedure  He denies any fever, chills, chest pain, shortness of breath, nausea, vomiting, or abdominal pain  His bowel movements have been normal   Patient denies history of DVT or PE  He has no obvious DVT or PE risk factors apart from recent surgery  Prior to Admission Medications   Prescriptions Last Dose Informant Patient Reported?  Taking?   oxyCODONE-acetaminophen (PERCOCET) 5-325 mg per tablet   No No   Sig: Take 1 tablet by mouth every 4 (four) hours as needed for moderate painMax Daily Amount: 6 tablets      Facility-Administered Medications: None       Past Medical History:   Diagnosis Date    Chronic dental pain     Left inguinal hernia     Umbilical hernia        Past Surgical History:   Procedure Laterality Date    HERNIA REPAIR Left 7/2/2020    Procedure: REPAIR HERNIA INGUINAL;  Surgeon: Jose Magallanes DO;  Location: [facility] MAIN OR;  Service: General Family History   Problem Relation Age of Onset    Cancer Neg Hx      I have reviewed and agree with the history as documented  E-Cigarette/Vaping    E-Cigarette Use Never User      E-Cigarette/Vaping Substances     Social History     Tobacco Use    Smoking status: Never Smoker    Smokeless tobacco: Never Used   Substance Use Topics    Alcohol use: Never     Frequency: Never    Drug use: Never       Review of Systems   Constitutional: Negative for diaphoresis, fever and unexpected weight change  HENT: Negative for congestion, rhinorrhea and sore throat  Eyes: Negative for pain, discharge and visual disturbance  Respiratory: Negative for cough, shortness of breath and wheezing  Cardiovascular: Negative for chest pain, palpitations and leg swelling  Gastrointestinal: Negative for abdominal pain, blood in stool, constipation, diarrhea, nausea and vomiting  Genitourinary: Negative for dysuria, flank pain and hematuria  Musculoskeletal: Positive for myalgias (left calf)  Negative for arthralgias  Skin: Negative for rash and wound  Allergic/Immunologic: Negative for environmental allergies and food allergies  Neurological: Negative for dizziness, seizures, weakness and numbness  Hematological: Negative for adenopathy  Psychiatric/Behavioral: Negative for confusion and hallucinations  Physical Exam  Physical Exam   Constitutional: He is oriented to person, place, and time  He appears well-developed and well-nourished  HENT:   Head: Normocephalic and atraumatic  Right Ear: External ear normal    Left Ear: External ear normal    Nose: Nose normal    Eyes: Pupils are equal, round, and reactive to light  EOM are normal    Neck: Normal range of motion  Neck supple  Cardiovascular: Normal rate, regular rhythm and normal heart sounds  No murmur heard  Pulmonary/Chest: Effort normal and breath sounds normal  No respiratory distress  He has no wheezes  He has no rales  Abdominal: Soft  Bowel sounds are normal  He exhibits no distension  There is no tenderness  There is no guarding  Left inguinal hernia incision appears clean, dry, and intact  Abdomen soft and nontender throughout  Musculoskeletal: Normal range of motion  He exhibits tenderness  He exhibits no edema or deformity  Patient has mild left calf tenderness to palpation  No obvious edema or erythema  Palpable DP pulse  Sensation intact in toes  Capillary refill under 2 seconds in all toes  Neurological: He is alert and oriented to person, place, and time  No gross motor deficits noted  Cranial nerves II-XII are intact  Speech is fluent without dysarthria or aphasia  Skin: Skin is warm and dry  He is not diaphoretic  Psychiatric: He has a normal mood and affect  His behavior is normal  Judgment and thought content normal    Nursing note and vitals reviewed        Vital Signs  ED Triage Vitals [07/08/20 1450]   Temperature Pulse Respirations Blood Pressure SpO2   98 3 °F (36 8 °C) 76 16 144/84 97 %      Temp Source Heart Rate Source Patient Position - Orthostatic VS BP Location FiO2 (%)   Temporal Monitor Sitting Right arm --      Pain Score       8           Vitals:    07/08/20 1450 07/08/20 1600 07/08/20 1630   BP: 144/84 150/78 141/84   Pulse: 76 73 61   Patient Position - Orthostatic VS: Sitting Sitting Lying         Visual Acuity      ED Medications  Medications - No data to display    Diagnostic Studies  Results Reviewed     Procedure Component Value Units Date/Time    Basic metabolic panel [[id number]]  (Abnormal) Collected:  07/08/20 1548    Lab Status:  Final result Specimen:  Blood from Arm, Right Updated:  07/08/20 1601     Sodium 135 mmol/L      Potassium 3 6 mmol/L      Chloride 102 mmol/L      CO2 27 mmol/L      ANION GAP 6 mmol/L      BUN 13 mg/dL      Creatinine 0 93 mg/dL      Glucose 106 mg/dL      Calcium 8 9 mg/dL      eGFR 92 ml/min/1 73sq m     Narrative:       National Kidney Disease Foundation guidelines for Chronic Kidney Disease (CKD):     Stage 1 with normal or high GFR (GFR > 90 mL/min/1 73 square meters)    Stage 2 Mild CKD (GFR = 60-89 mL/min/1 73 square meters)    Stage 3A Moderate CKD (GFR = 45-59 mL/min/1 73 square meters)    Stage 3B Moderate CKD (GFR = 30-44 mL/min/1 73 square meters)    Stage 4 Severe CKD (GFR = 15-29 mL/min/1 73 square meters)    Stage 5 End Stage CKD (GFR <15 mL/min/1 73 square meters)  Note: GFR calculation is accurate only with a steady state creatinine                 VAS lower limb venous duplex study, unilateral/limited   Final Result by Claude White MD (07/08 8293)                 Procedures  Procedures         ED Course  ED Course as of Jul 08 2359   Wed Jul 08, 2020   1602 Creatinine: 0 93   1602 eGFR: 92   1629 Spoke with vascular tech; venous duplex negative for DVT  Informed patient regarding negative result  Provided instructions regarding conservative care including ice, elevation, NSAIDs, Tylenol, and topical medications such as lidocaine creams or patches  US AUDIT      Most Recent Value   Initial Alcohol Screen: US AUDIT-C    1  How often do you have a drink containing alcohol?  0 Filed at: 07/08/2020 1451   2  How many drinks containing alcohol do you have on a typical day you are drinking? 0 Filed at: 07/08/2020 1451   3a  Male UNDER 65: How often do you have five or more drinks on one occasion? 0 Filed at: 07/08/2020 1451   3b  FEMALE Any Age, or MALE 65+: How often do you have 4 or more drinks on one occassion? 0 Filed at: 07/08/2020 1451   Audit-C Score  0 Filed at: 07/08/2020 1451                  FAZAL/DAST-10      Most Recent Value   How many times in the past year have you    Used an illegal drug or used a prescription medication for non-medical reasons?   Never Filed at: 07/08/2020 1451                                MDM  Number of Diagnoses or Management Options  Pain of left calf: new and requires workup  Status post inguinal hernia repair: established and improving  Diagnosis management comments:     Patient presented with left calf pain as detailed above  Character of pain suggestive of musculoskeletal etiology  Concern for DVT based on recent surgery  Physical examination not compatible with cellulitis  Venous duplex negative for DVT or other acute abnormality  Discussed this with patient  Advised him regarding conservative treatment for muscle strain  Discussed return precautions  Advised follow-up with PCP as needed and surgeon as directed  Patient verbalized understanding  Amount and/or Complexity of Data Reviewed  Clinical lab tests: ordered and reviewed  Tests in the radiology section of CPT®: ordered  Decide to obtain previous medical records or to obtain history from someone other than the patient: yes  Review and summarize past medical records: yes    Risk of Complications, Morbidity, and/or Mortality  Presenting problems: moderate  Diagnostic procedures: minimal  Management options: minimal    Patient Progress  Patient progress: improved        Disposition  Final diagnoses:   Pain of left calf   Status post inguinal hernia repair     Time reflects when diagnosis was documented in both MDM as applicable and the Disposition within this note     Time User Action Codes Description Comment    7/8/2020  4:29 PM Justine Forward Add [G52 669] Pain of left calf     7/8/2020  4:29 PM Justine Forward Add [V39 571,  Z87 19] Status post inguinal hernia repair       ED Disposition     ED Disposition Condition Date/Time Comment    Discharge Good Wed Jul 8, 2020  4:29 PM [address] [place] [address] discharge to home/self care  Follow-up Information     Follow up With Specialties Details Why Contact Info Additional [address], PA-C Physician Assistant Call  As needed  [address] [address]  [address]  Emergency Department Emergency Medicine Go to  If symptoms worsen  [place] [place] [address]  [phone] [facility], [place] [address], [place], [place], [address]          Discharge Medication List as of 7/8/2020  4:32 PM      CONTINUE these medications which have NOT CHANGED    Details   oxyCODONE-acetaminophen (PERCOCET) 5-325 mg per tablet Take 1 tablet by mouth every 4 (four) hours as needed for moderate painMax Daily Amount: 6 tablets, Starting Thu 7/2/2020, Normal           No discharge procedures on file      PDMP Review       Value Time User    PDMP Reviewed  Yes 7/3/2020 11:20 AM Rod Miller MD          ED Provider  Electronically Signed by           Tristin Gary MD  07/09/20 0000

## 2020-07-08 NOTE — DISCHARGE INSTRUCTIONS
The ultrasound of your leg did not show any evidence of DVT (blood clot in the vein) or other abnormalities  Your pain is likely from a muscle strain  Take Tylenol and ibuprofen as directed on the package for pain  You can also try over-the-counter lidocaine patches or creams which might be helpful  These can be purchased at a pharmacy  Try to keep your leg elevated when you are not walking  Return to the ER with redness of the leg, swelling of the leg, severe pain not relieved with medications, inability to walk, chest pain when 5 minutes, shortness of breath at rest or on exertion, severe abdominal pain, intractable nausea and vomiting, or any other concerning symptoms  Follow-up with your surgeon as directed

## (undated) DEVICE — SUT VICRYL 3-0 SH 27 IN J416H

## (undated) DEVICE — BETHLEHEM UNIVERSAL MINOR GEN: Brand: CARDINAL HEALTH

## (undated) DEVICE — SUT VICRYL 3-0 REEL 54 IN J285G

## (undated) DEVICE — NEEDLE 25G X 1 1/2

## (undated) DEVICE — SPECIMEN CONTAINER: Brand: CARDINAL HEALTH

## (undated) DEVICE — SUT VICRYL 2-0 SH 27 IN UNDYED J417H

## (undated) DEVICE — GLOVE INDICATOR UNDERGLOVE SZ 7.5 GREEN

## (undated) DEVICE — ADHESIVE SKIN CLSR DERMABOND NX

## (undated) DEVICE — GLOVE SRG BIOGEL 7

## (undated) DEVICE — STRL PENROSE DRAIN 18" X 1/4": Brand: CARDINAL HEALTH

## (undated) DEVICE — PENCIL ROCKER SWITCH CAUTERY HAND CONTROL

## (undated) DEVICE — SUT MONOCRYL 4-0 PS-2 27 IN Y426H

## (undated) DEVICE — ADHESIVE SKIN HIGH VISCOSITY EXOFIN 1ML

## (undated) DEVICE — SYRINGE 10ML LL

## (undated) DEVICE — ANTIBACTERIAL VIOLET BRAIDED (POLYGLACTIN 910), SYNTHETIC ABSORBABLE SUTURE: Brand: COATED VICRYL

## (undated) DEVICE — X-RAY DETECTABLE SPONGES,16 PLY: Brand: VISTEC

## (undated) DEVICE — CHLORAPREP HI-LITE 26ML ORANGE

## (undated) DEVICE — POOLE SUCTION HANDLE W/TUBING: Brand: CARDINAL HEALTH

## (undated) DEVICE — INTENDED FOR TISSUE SEPARATION, AND OTHER PROCEDURES THAT REQUIRE A SHARP SURGICAL BLADE TO PUNCTURE OR CUT.: Brand: BARD-PARKER ® CARBON RIB-BACK BLADES